# Patient Record
Sex: MALE | Race: OTHER | NOT HISPANIC OR LATINO
[De-identification: names, ages, dates, MRNs, and addresses within clinical notes are randomized per-mention and may not be internally consistent; named-entity substitution may affect disease eponyms.]

---

## 2017-08-15 ENCOUNTER — APPOINTMENT (OUTPATIENT)
Dept: ORTHOPEDIC SURGERY | Facility: CLINIC | Age: 72
End: 2017-08-15
Payer: COMMERCIAL

## 2017-08-15 VITALS
HEART RATE: 60 BPM | SYSTOLIC BLOOD PRESSURE: 141 MMHG | WEIGHT: 186 LBS | DIASTOLIC BLOOD PRESSURE: 80 MMHG | HEIGHT: 70 IN | BODY MASS INDEX: 26.63 KG/M2

## 2017-08-15 DIAGNOSIS — Z80.9 FAMILY HISTORY OF MALIGNANT NEOPLASM, UNSPECIFIED: ICD-10-CM

## 2017-08-15 DIAGNOSIS — Z85.46 PERSONAL HISTORY OF MALIGNANT NEOPLASM OF PROSTATE: ICD-10-CM

## 2017-08-15 DIAGNOSIS — M17.0 BILATERAL PRIMARY OSTEOARTHRITIS OF KNEE: ICD-10-CM

## 2017-08-15 PROCEDURE — 73501 X-RAY EXAM HIP UNI 1 VIEW: CPT

## 2017-08-15 PROCEDURE — 73562 X-RAY EXAM OF KNEE 3: CPT | Mod: 50

## 2017-08-15 PROCEDURE — 99204 OFFICE O/P NEW MOD 45 MIN: CPT

## 2017-08-15 RX ORDER — SIMVASTATIN 40 MG/1
40 TABLET, FILM COATED ORAL
Qty: 90 | Refills: 0 | Status: ACTIVE | COMMUNITY
Start: 2017-05-25

## 2017-08-15 RX ORDER — OLMESARTAN MEDOXOMIL AND HYDROCHLOROTHIAZIDE 20; 12.5 MG/1; MG/1
20-12.5 TABLET ORAL
Qty: 90 | Refills: 0 | Status: ACTIVE | COMMUNITY
Start: 2017-07-22

## 2017-08-15 RX ORDER — ASPIRIN 325 MG/1
325 TABLET, FILM COATED ORAL
Refills: 0 | Status: ACTIVE | COMMUNITY

## 2017-08-15 RX ORDER — POLYETHYLENE GLYCOL 3350, SODIUM CHLORIDE, SODIUM BICARBONATE AND POTASSIUM CHLORIDE WITH LEMON FLAVOR 420; 11.2; 5.72; 1.48 G/4L; G/4L; G/4L; G/4L
420 POWDER, FOR SOLUTION ORAL
Qty: 4000 | Refills: 0 | Status: DISCONTINUED | COMMUNITY
Start: 2017-06-05

## 2017-08-15 RX ORDER — CHOLECALCIFEROL (VITAMIN D3) 25 MCG
TABLET ORAL
Refills: 0 | Status: ACTIVE | COMMUNITY

## 2017-09-13 ENCOUNTER — MOBILE ON CALL (OUTPATIENT)
Age: 72
End: 2017-09-13

## 2017-10-13 ENCOUNTER — OUTPATIENT (OUTPATIENT)
Dept: OUTPATIENT SERVICES | Facility: HOSPITAL | Age: 72
LOS: 1 days | End: 2017-10-13
Payer: COMMERCIAL

## 2017-10-13 VITALS
SYSTOLIC BLOOD PRESSURE: 144 MMHG | TEMPERATURE: 98 F | DIASTOLIC BLOOD PRESSURE: 88 MMHG | OXYGEN SATURATION: 97 % | HEIGHT: 70.3 IN | RESPIRATION RATE: 17 BRPM | WEIGHT: 187.39 LBS | HEART RATE: 62 BPM

## 2017-10-13 DIAGNOSIS — Z90.79 ACQUIRED ABSENCE OF OTHER GENITAL ORGAN(S): Chronic | ICD-10-CM

## 2017-10-13 DIAGNOSIS — Z98.89 OTHER SPECIFIED POSTPROCEDURAL STATES: Chronic | ICD-10-CM

## 2017-10-13 DIAGNOSIS — N36.42 INTRINSIC SPHINCTER DEFICIENCY (ISD): Chronic | ICD-10-CM

## 2017-10-13 DIAGNOSIS — Z01.818 ENCOUNTER FOR OTHER PREPROCEDURAL EXAMINATION: ICD-10-CM

## 2017-10-13 DIAGNOSIS — Z98.890 OTHER SPECIFIED POSTPROCEDURAL STATES: Chronic | ICD-10-CM

## 2017-10-13 DIAGNOSIS — M25.562 PAIN IN LEFT KNEE: ICD-10-CM

## 2017-10-13 DIAGNOSIS — M17.12 UNILATERAL PRIMARY OSTEOARTHRITIS, LEFT KNEE: ICD-10-CM

## 2017-10-13 DIAGNOSIS — N36.42 INTRINSIC SPHINCTER DEFICIENCY (ISD): ICD-10-CM

## 2017-10-13 LAB
ALBUMIN SERPL ELPH-MCNC: 4.4 G/DL — SIGNIFICANT CHANGE UP (ref 3.3–5)
ALP SERPL-CCNC: 56 U/L — SIGNIFICANT CHANGE UP (ref 30–120)
ALT FLD-CCNC: 31 U/L DA — SIGNIFICANT CHANGE UP (ref 10–60)
ANION GAP SERPL CALC-SCNC: 6 MMOL/L — SIGNIFICANT CHANGE UP (ref 5–17)
APTT BLD: 61.7 SEC — HIGH (ref 27.5–37.4)
AST SERPL-CCNC: 20 U/L — SIGNIFICANT CHANGE UP (ref 10–40)
BILIRUB SERPL-MCNC: 1.2 MG/DL — SIGNIFICANT CHANGE UP (ref 0.2–1.2)
BLD GP AB SCN SERPL QL: SIGNIFICANT CHANGE UP
BUN SERPL-MCNC: 20 MG/DL — SIGNIFICANT CHANGE UP (ref 7–23)
CALCIUM SERPL-MCNC: 9.2 MG/DL — SIGNIFICANT CHANGE UP (ref 8.4–10.5)
CHLORIDE SERPL-SCNC: 106 MMOL/L — SIGNIFICANT CHANGE UP (ref 96–108)
CO2 SERPL-SCNC: 30 MMOL/L — SIGNIFICANT CHANGE UP (ref 22–31)
CREAT SERPL-MCNC: 1.14 MG/DL — SIGNIFICANT CHANGE UP (ref 0.5–1.3)
GLUCOSE SERPL-MCNC: 115 MG/DL — HIGH (ref 70–99)
HCT VFR BLD CALC: 47.1 % — SIGNIFICANT CHANGE UP (ref 39–50)
HGB BLD-MCNC: 15.7 G/DL — SIGNIFICANT CHANGE UP (ref 13–17)
INR BLD: 1.08 RATIO — SIGNIFICANT CHANGE UP (ref 0.88–1.16)
MCHC RBC-ENTMCNC: 30.2 PG — SIGNIFICANT CHANGE UP (ref 27–34)
MCHC RBC-ENTMCNC: 33.3 GM/DL — SIGNIFICANT CHANGE UP (ref 32–36)
MCV RBC AUTO: 90.6 FL — SIGNIFICANT CHANGE UP (ref 80–100)
PLATELET # BLD AUTO: 125 K/UL — LOW (ref 150–400)
POTASSIUM SERPL-MCNC: 4 MMOL/L — SIGNIFICANT CHANGE UP (ref 3.5–5.3)
POTASSIUM SERPL-SCNC: 4 MMOL/L — SIGNIFICANT CHANGE UP (ref 3.5–5.3)
PROT SERPL-MCNC: 7.5 G/DL — SIGNIFICANT CHANGE UP (ref 6–8.3)
PROTHROM AB SERPL-ACNC: 11.8 SEC — SIGNIFICANT CHANGE UP (ref 9.8–12.7)
RBC # BLD: 5.2 M/UL — SIGNIFICANT CHANGE UP (ref 4.2–5.8)
RBC # FLD: 12 % — SIGNIFICANT CHANGE UP (ref 10.3–14.5)
SODIUM SERPL-SCNC: 142 MMOL/L — SIGNIFICANT CHANGE UP (ref 135–145)
WBC # BLD: 7.1 K/UL — SIGNIFICANT CHANGE UP (ref 3.8–10.5)
WBC # FLD AUTO: 7.1 K/UL — SIGNIFICANT CHANGE UP (ref 3.8–10.5)

## 2017-10-13 PROCEDURE — 87641 MR-STAPH DNA AMP PROBE: CPT

## 2017-10-13 PROCEDURE — G0463: CPT

## 2017-10-13 PROCEDURE — 80053 COMPREHEN METABOLIC PANEL: CPT

## 2017-10-13 PROCEDURE — 86850 RBC ANTIBODY SCREEN: CPT

## 2017-10-13 PROCEDURE — 86901 BLOOD TYPING SEROLOGIC RH(D): CPT

## 2017-10-13 PROCEDURE — 36415 COLL VENOUS BLD VENIPUNCTURE: CPT

## 2017-10-13 PROCEDURE — 93005 ELECTROCARDIOGRAM TRACING: CPT

## 2017-10-13 PROCEDURE — 85730 THROMBOPLASTIN TIME PARTIAL: CPT

## 2017-10-13 PROCEDURE — 85027 COMPLETE CBC AUTOMATED: CPT

## 2017-10-13 PROCEDURE — 87640 STAPH A DNA AMP PROBE: CPT

## 2017-10-13 PROCEDURE — 93010 ELECTROCARDIOGRAM REPORT: CPT | Mod: NC

## 2017-10-13 PROCEDURE — 85610 PROTHROMBIN TIME: CPT

## 2017-10-13 PROCEDURE — 86900 BLOOD TYPING SEROLOGIC ABO: CPT

## 2017-10-13 NOTE — H&P PST ADULT - PSH
H/O colonoscopy with polypectomy  2017- benign  H/O radical prostatectomy  2000  S/P arthroscopy of left knee  2005- miniscus repair  S/P tonsillectomy  1954  Urethral sphincter deficiency  s/p urethral sphincter implant 2009; replacement 2014 H/O colonoscopy with polypectomy  2017- benign  H/O radical prostatectomy  2000  S/P arthroscopy of left knee  2005- meniscus repair  S/P tonsillectomy  1954  Urethral sphincter deficiency  s/p urethral sphincter implant 2009; replacement 2014

## 2017-10-13 NOTE — H&P PST ADULT - PROBLEM SELECTOR PLAN 2
Patient seeing urologist 10/31/17 to disable urethral sphincter. Patient will get recommendations for post-op urology management.

## 2017-10-13 NOTE — H&P PST ADULT - FAMILY HISTORY
Mother  Still living? No  Family history of diabetes mellitus (DM), Age at diagnosis: Age Unknown  Family history of CHF (congestive heart failure), Age at diagnosis: Age Unknown     Father  Still living? Unknown  Family history of lung cancer, Age at diagnosis: Age Unknown  Family history of coronary artery disease, Age at diagnosis: Age Unknown

## 2017-10-13 NOTE — H&P PST ADULT - PMH
HTN (hypertension)  X 15 years, controlled  Hyperlipidemia    LVH (left ventricular hypertrophy) due to hypertensive disease    Nocturia more than twice per night  3 times a night  Prostate cancer  1999, s/p radical prostatectomy  Urinary incontinence  s/p urethral sphincter implant 2009; replacement 2014  Vitamin d deficiency HTN (hypertension)  X 15 years, controlled  Hyperlipidemia    Knee pain, left    LVH (left ventricular hypertrophy) due to hypertensive disease    Nocturia more than twice per night  3 times a night  Osteoarthritis    Prostate cancer  1999, s/p radical prostatectomy  Urethral sphincter deficiency  s/p urethral sphincter implant 2009; replacement 2014  Urinary incontinence  s/p urethral sphincter implant 2009; replacement 2014  Vitamin d deficiency

## 2017-10-13 NOTE — H&P PST ADULT - VENOUS THROMBOEMBOLISM CURRENT STATUS
major surgery, including arthroscopic and laparoscopic (greater than 1 hr) elective major lower extremity arthroplasty

## 2017-10-13 NOTE — H&P PST ADULT - RS GEN PE MLT RESP DETAILS PC
normal/no rales/no wheezes/no rhonchi/respirations non-labored/breath sounds equal/clear to auscultation bilaterally/good air movement/airway patent

## 2017-10-13 NOTE — H&P PST ADULT - PROBLEM SELECTOR PLAN 1
Left total knee replacement. Patient educated and demonstrated understanding on pepcid and surgical wash, medical and cardiac clearance. Patient educated and demonstrated understanding to take pepcid, benicar, labetalol, norvasc, and quinapril AM of surgery. Patient will see cardiologist to be instructed about stopping aspirin.

## 2017-10-13 NOTE — H&P PST ADULT - HISTORY OF PRESENT ILLNESS
71yo male presents due to left pain. Patient had a arthroscopy to repair a torn meniscus in 2006 without pain relief. Patient stated Physical therapy increased pain. Patient reports current pain 0/10 at rest; 7-8/10 with activity especially walking down stairs. Patient denies use of any pain medication at this time. Patient denies tingling or numbing in lower extremities.

## 2017-10-13 NOTE — H&P PST ADULT - MUSCULOSKELETAL
detailed exam no calf tenderness/decreased ROM due to pain/no joint swelling/no joint erythema/diminished strength/decreased ROM/no joint warmth details…

## 2017-10-14 LAB
MRSA PCR RESULT.: SIGNIFICANT CHANGE UP
S AUREUS DNA NOSE QL NAA+PROBE: DETECTED

## 2017-10-16 RX ORDER — MUPIROCIN 20 MG/G
1 OINTMENT TOPICAL
Qty: 16 | Refills: 0 | OUTPATIENT
Start: 2017-10-16 | End: 2017-10-21

## 2017-10-16 NOTE — PROGRESS NOTE ADULT - SUBJECTIVE AND OBJECTIVE BOX
Nose cx results  + MSSA- Methicillin Sensitive Staph Aureus    Ordered Mupirocin 2% intranasally BID x 5 Days preop left message for pt to call back. Nose cx results  + MSSA- Methicillin Sensitive Staph Aureus    Ordered Mupirocin 2% intranasally BID x 5 Days preop left message for pt to call back.  10-17-17  12:50 pmcalled and spoke w pt will  med today and also  reviewed use and documentation on sheet

## 2017-10-26 RX ORDER — LABETALOL HCL 100 MG
2 TABLET ORAL
Qty: 0 | Refills: 0 | COMMUNITY

## 2017-10-26 RX ORDER — SODIUM CHLORIDE 9 MG/ML
1000 INJECTION, SOLUTION INTRAVENOUS
Qty: 0 | Refills: 0 | Status: DISCONTINUED | OUTPATIENT
Start: 2017-11-01 | End: 2017-11-01

## 2017-10-26 RX ORDER — APREPITANT 80 MG/1
40 CAPSULE ORAL ONCE
Qty: 0 | Refills: 0 | Status: COMPLETED | OUTPATIENT
Start: 2017-11-01 | End: 2017-11-01

## 2017-10-30 NOTE — PROVIDER CONTACT NOTE (OTHER) - ACTION/TREATMENT ORDERED:
spoke to pt's wife who states pt is using Mupirocin. Verbalized understanding to use 2x/day for a total of 5 days. Reminded to bring checklist to hospital on day of surgery

## 2017-10-31 ENCOUNTER — APPOINTMENT (OUTPATIENT)
Dept: UROLOGY | Facility: CLINIC | Age: 72
End: 2017-10-31
Payer: COMMERCIAL

## 2017-10-31 PROCEDURE — 99203 OFFICE O/P NEW LOW 30 MIN: CPT

## 2017-10-31 NOTE — PROGRESS NOTE ADULT - SUBJECTIVE AND OBJECTIVE BOX
Presurgical evaluation:  NKDA  Intolerance: morphine: Nausea    Home Medications:   · 	quinapril 20 mg 2 times a day  · 	Amlodipine (Norvasc®) 5 mg 2 times a day  · 	Olmesartan-HCTZ (Benicar HCT®) 12.5 mg-20 mg once a day  · 	Vitamin D3 2000 intl units once a day  · 	Labetalol 200 mg --2 tabs (400mg) 2 times a day  · 	simvastatin 40 mg once a day (at bedtime)  · 	aspirin 325 mg once a day (at bedtime)    Past Medical History:  HTN (hypertension)  X 15 years, controlled  Hyperlipidemia    Knee pain, left    LVH (left ventricular hypertrophy) due to hypertensive disease    Nocturia more than twice per night  3 times a night  Osteoarthritis    Prostate cancer  1999, s/p radical prostatectomy  Urethral sphincter deficiency  s/p urethral sphincter implant 2009; replacement 2014  Urinary incontinence  s/p urethral sphincter implant 2009; replacement 2014  Vitamin d deficiency     Past Surgical History:  H/O radical prostatectomy  2000  Urethral sphincter deficiency  s/p urethral sphincter implant 2009; replacement 2014    PST values of interest:  Plt 125 (¯)  aPTT 61.7 (­) – cleared by hematology  SCr 1.14 mg/dL  LFTs WNL    Recommendations:  1.	IV TXA  2.	Acetaminophen and celecoxib for multimodal pain management  3.	VTE prophylaxis: ASA EC 162mg q12h x 6 weeks then restart ASA 325mg Qday. Caprini score 11 – if anything increases patient’s risk for VTE (eg, not ambulating, blood transfusion), change to Eliquis® 2.5mg q12h x 12 days followed by ASA EC 162mg q12h x 4 weeks. Presurgical evaluation:  NKDA  Intolerance: morphine: Nausea    Home Medications:   · 	quinapril 20 mg 2 times a day  · 	Amlodipine (Norvasc®) 5 mg 2 times a day  · 	Olmesartan-HCTZ (Benicar HCT®) 12.5 mg-20 mg once a day  · 	Vitamin D3 2000 intl units once a day  · 	Labetalol 200 mg --2 tabs (400mg) 2 times a day  · 	simvastatin 40 mg once a day (at bedtime)  · 	aspirin 325 mg once a day (at bedtime)    Past Medical History:  HTN (hypertension)  X 15 years, controlled  Hyperlipidemia    Knee pain, left    LVH (left ventricular hypertrophy) due to hypertensive disease    Nocturia more than twice per night  3 times a night  Osteoarthritis    Prostate cancer  1999, s/p radical prostatectomy  Urethral sphincter deficiency  s/p urethral sphincter implant 2009; replacement 2014  Urinary incontinence  s/p urethral sphincter implant 2009; replacement 2014  Vitamin d deficiency     Past Surgical History:  H/O radical prostatectomy  2000  Urethral sphincter deficiency  s/p urethral sphincter implant 2009; replacement 2014    PST values of interest:  Plt 125 (¯)  aPTT 61.7 (­) – cleared by hematology  SCr 1.14 mg/dL  LFTs WNL    Recommendations:  1.	IV TXA  2.	Acetaminophen and celecoxib for multimodal pain management  3.	VTE prophylaxis: ASA EC 162mg q12h x 6 weeks then restart ASA 325mg Qday. Caprini score 11 – if anything increases patient’s risk for VTE (eg, not ambulating, blood transfusion), change to Eliquis® 2.5mg q12h x 12 days followed by ASA EC 162mg q12h x 4 weeks.  4.	Patient is a current cigar smoker – offer patient nicotine replacement therapy (eg, patch, inhaler) for breakthrough cravings

## 2017-11-01 ENCOUNTER — INPATIENT (INPATIENT)
Facility: HOSPITAL | Age: 72
LOS: 1 days | Discharge: HOME | DRG: 470 | End: 2017-11-03
Attending: ORTHOPAEDIC SURGERY | Admitting: ORTHOPAEDIC SURGERY
Payer: COMMERCIAL

## 2017-11-01 ENCOUNTER — APPOINTMENT (OUTPATIENT)
Dept: ORTHOPEDIC SURGERY | Facility: HOSPITAL | Age: 72
End: 2017-11-01

## 2017-11-01 ENCOUNTER — RESULT REVIEW (OUTPATIENT)
Age: 72
End: 2017-11-01

## 2017-11-01 VITALS
SYSTOLIC BLOOD PRESSURE: 133 MMHG | HEIGHT: 71 IN | TEMPERATURE: 98 F | HEART RATE: 68 BPM | WEIGHT: 180.78 LBS | RESPIRATION RATE: 13 BRPM | DIASTOLIC BLOOD PRESSURE: 75 MMHG | OXYGEN SATURATION: 98 %

## 2017-11-01 DIAGNOSIS — E78.5 HYPERLIPIDEMIA, UNSPECIFIED: ICD-10-CM

## 2017-11-01 DIAGNOSIS — Z98.890 OTHER SPECIFIED POSTPROCEDURAL STATES: Chronic | ICD-10-CM

## 2017-11-01 DIAGNOSIS — M17.12 UNILATERAL PRIMARY OSTEOARTHRITIS, LEFT KNEE: ICD-10-CM

## 2017-11-01 DIAGNOSIS — Z90.79 ACQUIRED ABSENCE OF OTHER GENITAL ORGAN(S): Chronic | ICD-10-CM

## 2017-11-01 DIAGNOSIS — Z98.89 OTHER SPECIFIED POSTPROCEDURAL STATES: Chronic | ICD-10-CM

## 2017-11-01 DIAGNOSIS — I10 ESSENTIAL (PRIMARY) HYPERTENSION: ICD-10-CM

## 2017-11-01 DIAGNOSIS — N36.42 INTRINSIC SPHINCTER DEFICIENCY (ISD): Chronic | ICD-10-CM

## 2017-11-01 DIAGNOSIS — M25.562 PAIN IN LEFT KNEE: ICD-10-CM

## 2017-11-01 LAB
ANION GAP SERPL CALC-SCNC: 10 MMOL/L — SIGNIFICANT CHANGE UP (ref 5–17)
BUN SERPL-MCNC: 24 MG/DL — HIGH (ref 7–23)
CALCIUM SERPL-MCNC: 9.1 MG/DL — SIGNIFICANT CHANGE UP (ref 8.4–10.5)
CHLORIDE SERPL-SCNC: 103 MMOL/L — SIGNIFICANT CHANGE UP (ref 96–108)
CO2 SERPL-SCNC: 28 MMOL/L — SIGNIFICANT CHANGE UP (ref 22–31)
CREAT SERPL-MCNC: 1.41 MG/DL — HIGH (ref 0.5–1.3)
GLUCOSE SERPL-MCNC: 201 MG/DL — HIGH (ref 70–99)
HCT VFR BLD CALC: 44.2 % — SIGNIFICANT CHANGE UP (ref 39–50)
HGB BLD-MCNC: 14.7 G/DL — SIGNIFICANT CHANGE UP (ref 13–17)
MCHC RBC-ENTMCNC: 29.9 PG — SIGNIFICANT CHANGE UP (ref 27–34)
MCHC RBC-ENTMCNC: 33.3 GM/DL — SIGNIFICANT CHANGE UP (ref 32–36)
MCV RBC AUTO: 90 FL — SIGNIFICANT CHANGE UP (ref 80–100)
PLATELET # BLD AUTO: 145 K/UL — LOW (ref 150–400)
POTASSIUM SERPL-MCNC: 3.4 MMOL/L — LOW (ref 3.5–5.3)
POTASSIUM SERPL-SCNC: 3.4 MMOL/L — LOW (ref 3.5–5.3)
RBC # BLD: 4.92 M/UL — SIGNIFICANT CHANGE UP (ref 4.2–5.8)
RBC # FLD: 11.8 % — SIGNIFICANT CHANGE UP (ref 10.3–14.5)
SODIUM SERPL-SCNC: 141 MMOL/L — SIGNIFICANT CHANGE UP (ref 135–145)
WBC # BLD: 13.5 K/UL — HIGH (ref 3.8–10.5)
WBC # FLD AUTO: 13.5 K/UL — HIGH (ref 3.8–10.5)

## 2017-11-01 PROCEDURE — 88311 DECALCIFY TISSUE: CPT | Mod: 26

## 2017-11-01 PROCEDURE — 27447 TOTAL KNEE ARTHROPLASTY: CPT | Mod: 82,LT

## 2017-11-01 PROCEDURE — 27447 TOTAL KNEE ARTHROPLASTY: CPT | Mod: LT

## 2017-11-01 PROCEDURE — 99223 1ST HOSP IP/OBS HIGH 75: CPT

## 2017-11-01 PROCEDURE — 73562 X-RAY EXAM OF KNEE 3: CPT | Mod: 26,LT

## 2017-11-01 PROCEDURE — 88305 TISSUE EXAM BY PATHOLOGIST: CPT | Mod: 26

## 2017-11-01 RX ORDER — HYDROMORPHONE HYDROCHLORIDE 2 MG/ML
0.5 INJECTION INTRAMUSCULAR; INTRAVENOUS; SUBCUTANEOUS
Qty: 0 | Refills: 0 | Status: DISCONTINUED | OUTPATIENT
Start: 2017-11-01 | End: 2017-11-03

## 2017-11-01 RX ORDER — PANTOPRAZOLE SODIUM 20 MG/1
40 TABLET, DELAYED RELEASE ORAL DAILY
Qty: 0 | Refills: 0 | Status: DISCONTINUED | OUTPATIENT
Start: 2017-11-01 | End: 2017-11-03

## 2017-11-01 RX ORDER — SIMVASTATIN 20 MG/1
1 TABLET, FILM COATED ORAL
Qty: 0 | Refills: 0 | COMMUNITY

## 2017-11-01 RX ORDER — LABETALOL HCL 100 MG
200 TABLET ORAL
Qty: 0 | Refills: 0 | Status: DISCONTINUED | OUTPATIENT
Start: 2017-11-01 | End: 2017-11-03

## 2017-11-01 RX ORDER — LABETALOL HCL 100 MG
400 TABLET ORAL
Qty: 0 | Refills: 0 | COMMUNITY

## 2017-11-01 RX ORDER — ACETAMINOPHEN 500 MG
1000 TABLET ORAL ONCE
Qty: 0 | Refills: 0 | Status: COMPLETED | OUTPATIENT
Start: 2017-11-01 | End: 2017-11-01

## 2017-11-01 RX ORDER — SIMVASTATIN 20 MG/1
40 TABLET, FILM COATED ORAL AT BEDTIME
Qty: 0 | Refills: 0 | Status: DISCONTINUED | OUTPATIENT
Start: 2017-11-01 | End: 2017-11-03

## 2017-11-01 RX ORDER — DOCUSATE SODIUM 100 MG
100 CAPSULE ORAL THREE TIMES A DAY
Qty: 0 | Refills: 0 | Status: DISCONTINUED | OUTPATIENT
Start: 2017-11-01 | End: 2017-11-03

## 2017-11-01 RX ORDER — CHOLECALCIFEROL (VITAMIN D3) 125 MCG
2000 CAPSULE ORAL DAILY
Qty: 0 | Refills: 0 | Status: DISCONTINUED | OUTPATIENT
Start: 2017-11-01 | End: 2017-11-01

## 2017-11-01 RX ORDER — OLMESARTAN MEDOXOMIL-HYDROCHLOROTHIAZIDE 25; 40 MG/1; MG/1
1 TABLET, FILM COATED ORAL
Qty: 0 | Refills: 0 | COMMUNITY

## 2017-11-01 RX ORDER — TRANEXAMIC ACID 100 MG/ML
1000 INJECTION, SOLUTION INTRAVENOUS ONCE
Qty: 0 | Refills: 0 | Status: DISCONTINUED | OUTPATIENT
Start: 2017-11-01 | End: 2017-11-01

## 2017-11-01 RX ORDER — HYDROMORPHONE HYDROCHLORIDE 2 MG/ML
0.5 INJECTION INTRAMUSCULAR; INTRAVENOUS; SUBCUTANEOUS
Qty: 0 | Refills: 0 | Status: DISCONTINUED | OUTPATIENT
Start: 2017-11-01 | End: 2017-11-01

## 2017-11-01 RX ORDER — CEFAZOLIN SODIUM 1 G
2000 VIAL (EA) INJECTION EVERY 8 HOURS
Qty: 0 | Refills: 0 | Status: COMPLETED | OUTPATIENT
Start: 2017-11-01 | End: 2017-11-02

## 2017-11-01 RX ORDER — AMLODIPINE BESYLATE 2.5 MG/1
5 TABLET ORAL
Qty: 0 | Refills: 0 | Status: DISCONTINUED | OUTPATIENT
Start: 2017-11-01 | End: 2017-11-03

## 2017-11-01 RX ORDER — ASPIRIN/CALCIUM CARB/MAGNESIUM 324 MG
162 TABLET ORAL EVERY 12 HOURS
Qty: 0 | Refills: 0 | Status: CANCELLED | OUTPATIENT
Start: 2017-11-02 | End: 2017-11-03

## 2017-11-01 RX ORDER — LOSARTAN POTASSIUM 100 MG/1
100 TABLET, FILM COATED ORAL DAILY
Qty: 0 | Refills: 0 | Status: DISCONTINUED | OUTPATIENT
Start: 2017-11-02 | End: 2017-11-03

## 2017-11-01 RX ORDER — OXYCODONE HYDROCHLORIDE 5 MG/1
10 TABLET ORAL
Qty: 0 | Refills: 0 | Status: DISCONTINUED | OUTPATIENT
Start: 2017-11-01 | End: 2017-11-03

## 2017-11-01 RX ORDER — SODIUM CHLORIDE 9 MG/ML
1000 INJECTION, SOLUTION INTRAVENOUS
Qty: 0 | Refills: 0 | Status: DISCONTINUED | OUTPATIENT
Start: 2017-11-01 | End: 2017-11-01

## 2017-11-01 RX ORDER — ONDANSETRON 8 MG/1
4 TABLET, FILM COATED ORAL ONCE
Qty: 0 | Refills: 0 | Status: DISCONTINUED | OUTPATIENT
Start: 2017-11-01 | End: 2017-11-01

## 2017-11-01 RX ORDER — CEFAZOLIN SODIUM 1 G
2000 VIAL (EA) INJECTION ONCE
Qty: 0 | Refills: 0 | Status: COMPLETED | OUTPATIENT
Start: 2017-11-01 | End: 2017-11-01

## 2017-11-01 RX ORDER — ACETAMINOPHEN 500 MG
1000 TABLET ORAL EVERY 6 HOURS
Qty: 0 | Refills: 0 | Status: COMPLETED | OUTPATIENT
Start: 2017-11-01 | End: 2017-11-02

## 2017-11-01 RX ORDER — ONDANSETRON 8 MG/1
4 TABLET, FILM COATED ORAL EVERY 6 HOURS
Qty: 0 | Refills: 0 | Status: DISCONTINUED | OUTPATIENT
Start: 2017-11-01 | End: 2017-11-03

## 2017-11-01 RX ORDER — MAGNESIUM HYDROXIDE 400 MG/1
30 TABLET, CHEWABLE ORAL DAILY
Qty: 0 | Refills: 0 | Status: DISCONTINUED | OUTPATIENT
Start: 2017-11-01 | End: 2017-11-03

## 2017-11-01 RX ORDER — POLYETHYLENE GLYCOL 3350 17 G/17G
17 POWDER, FOR SOLUTION ORAL DAILY
Qty: 0 | Refills: 0 | Status: DISCONTINUED | OUTPATIENT
Start: 2017-11-01 | End: 2017-11-03

## 2017-11-01 RX ORDER — SODIUM CHLORIDE 9 MG/ML
1000 INJECTION, SOLUTION INTRAVENOUS
Qty: 0 | Refills: 0 | Status: DISCONTINUED | OUTPATIENT
Start: 2017-11-01 | End: 2017-11-02

## 2017-11-01 RX ORDER — ASPIRIN/CALCIUM CARB/MAGNESIUM 324 MG
1 TABLET ORAL
Qty: 0 | Refills: 0 | COMMUNITY

## 2017-11-01 RX ORDER — OXYCODONE HYDROCHLORIDE 5 MG/1
5 TABLET ORAL
Qty: 0 | Refills: 0 | Status: DISCONTINUED | OUTPATIENT
Start: 2017-11-01 | End: 2017-11-03

## 2017-11-01 RX ORDER — ACETAMINOPHEN 500 MG
1000 TABLET ORAL EVERY 8 HOURS
Qty: 0 | Refills: 0 | Status: CANCELLED | OUTPATIENT
Start: 2017-11-02 | End: 2017-11-03

## 2017-11-01 RX ORDER — CELECOXIB 200 MG/1
200 CAPSULE ORAL
Qty: 0 | Refills: 0 | Status: CANCELLED | OUTPATIENT
Start: 2017-11-02 | End: 2017-11-03

## 2017-11-01 RX ORDER — CHOLECALCIFEROL (VITAMIN D3) 125 MCG
2000 CAPSULE ORAL DAILY
Qty: 0 | Refills: 0 | Status: DISCONTINUED | OUTPATIENT
Start: 2017-11-01 | End: 2017-11-03

## 2017-11-01 RX ADMIN — Medication 400 MILLIGRAM(S): at 19:28

## 2017-11-01 RX ADMIN — SIMVASTATIN 40 MILLIGRAM(S): 20 TABLET, FILM COATED ORAL at 21:05

## 2017-11-01 RX ADMIN — SODIUM CHLORIDE 75 MILLILITER(S): 9 INJECTION, SOLUTION INTRAVENOUS at 11:16

## 2017-11-01 RX ADMIN — Medication 100 MILLIGRAM(S): at 21:05

## 2017-11-01 RX ADMIN — APREPITANT 40 MILLIGRAM(S): 80 CAPSULE ORAL at 11:16

## 2017-11-01 RX ADMIN — Medication 100 MILLIGRAM(S): at 19:59

## 2017-11-01 RX ADMIN — Medication 1000 MILLIGRAM(S): at 19:29

## 2017-11-01 NOTE — PATIENT PROFILE ADULT. - PMH
HTN (hypertension)  X 15 years, controlled  Hyperlipidemia    Knee pain, left    LVH (left ventricular hypertrophy) due to hypertensive disease    Nocturia more than twice per night  3 times a night  Osteoarthritis    Prostate cancer  1999, s/p radical prostatectomy  Urethral sphincter deficiency  s/p urethral sphincter implant 2009; replacement 2014  Urinary incontinence  s/p urethral sphincter implant 2009; replacement 2014  Vitamin d deficiency

## 2017-11-01 NOTE — CONSULT NOTE ADULT - SUBJECTIVE AND OBJECTIVE BOX
CC.  Left knee pain    HPI.  Patient is 71 yo male with hx of HTN, and HLD presenting with S/P left Total knee replacement.  Pain well controlled.  Offers no other complaints  Prior to surgery, patient reports left knee pain Has not improved S/P arthroscopy to repair a torn meniscus in 2006.  Patient stated Physical therapy increased pain. Patient reports current pain 0/10 at rest; 7-8/10 with activity especially walking down stairs. Patient denies use of any pain medication at this time. Patient denies tingling or numbing in lower extremities.         Constitutional: No fever, fatigue or weight loss.  Skin: No rash.  Eyes: No recent vision problems or eye pain.  ENT: No congestion, ear pain, or sore throat.  Endocrine: No thyroid problems.  Cardiovascular: No chest pain or palpation.  Respiratory: No cough, shortness of breath, congestion, or wheezing.  Gastrointestinal: No abdominal pain, nausea, vomiting, or diarrhea.  Genitourinary: No dysuria.  Musculoskeletal: No joint swelling.  Neurologic: No headache.      Allergies/Medications:   morphine: Drug, Nausea    Home Medications:   * Patient Currently Takes Medications as of 13-Oct-2017 12:37 documented in Structured Notes  · 	quinapril 20 mg oral tablet: Last Dose Taken:  ,  orally 2 times a day  · 	Norvasc 5 mg oral tablet: Last Dose Taken:  ,  orally 2 times a day  · 	Benicar HCT 12.5 mg-20 mg oral tablet: Last Dose Taken:  , 1 tab(s) orally once a day  · 	Vitamin D3 2000 intl units oral capsule: Last Dose Taken:  , 1 cap(s) orally once a day  · 	labetalol 200 mg oral tablet: Last Dose Taken:  , 2 tab(s) orally 2 times a day  · 	simvastatin 40 mg oral tablet: Last Dose Taken:  , 1 tab(s) orally once a day (at bedtime)  · 	aspirin 325 mg oral tablet: Last Dose Taken:  , 1 tab(s) orally once a day (at bedtime)    .    PMH/PSH/FH/SH:    Past Medical History:  HTN (hypertension)  X 15 years, controlled  Hyperlipidemia    Knee pain, left    LVH (left ventricular hypertrophy) due to hypertensive disease    Nocturia more than twice per night  3 times a night  Osteoarthritis    Prostate cancer  1999, s/p radical prostatectomy  Urethral sphincter deficiency  s/p urethral sphincter implant 2009; replacement 2014  Urinary incontinence  s/p urethral sphincter implant 2009; replacement 2014  Vitamin d deficiency.     Past Surgical History:  H/O colonoscopy with polypectomy  2017- benign  H/O radical prostatectomy  2000  S/P arthroscopy of left knee  2005- meniscus repair  S/P tonsillectomy  1954  Urethral sphincter deficiency  s/p urethral sphincter implant 2009; replacement 2014.      Vital Signs Last 24 Hrs  T(C): 36.9 (01 Nov 2017 14:45), Max: 36.9 (01 Nov 2017 14:45)  T(F): 98.4 (01 Nov 2017 14:45), Max: 98.4 (01 Nov 2017 14:45)  HR: 60 (01 Nov 2017 15:30) (57 - 71)  BP: 131/83 (01 Nov 2017 15:30) (103/72 - 133/75)  BP(mean): --  RR: 16 (01 Nov 2017 15:30) (12 - 22)  SpO2: 100% (01 Nov 2017 15:30) (98% - 100%)      PHYSICAL EXAM-  GENERAL: NAD  HEAD:  Atraumatic, Normocephalic  NECK: Supple, No JVD, Normal thyroid  NERVOUS SYSTEM:  Alert & Oriented X3, Motor Strength 5/5 B/L upper and lower extremities; DTRs 2+ intact and symmetric  CHEST/LUNG: Clear to percussion bilaterally; No rales, rhonchi, wheezing, or rubs  HEART: Regular rate and rhythm; No murmurs, rubs, or gallops  ABDOMEN: Soft, Nontender, Nondistended; Bowel sounds present  EXTREMITIES:  2+ Peripheral Pulses, No clubbing, cyanosis, or edema  SKIN: No rashes or lesions CC.  Left knee pain    HPI.  Patient is 71 yo male with hx of HTN, and HLD presenting with S/P left Total knee replacement.  Pain well controlled.  Offers no other complaints  Prior to surgery, patient reports left knee pain Has not improved S/P arthroscopy to repair a torn meniscus in 2006.  Patient stated Physical therapy increased pain. Patient reports current pain 0/10 at rest; 7-8/10 with activity especially walking down stairs. Patient denies use of any pain medication at this time. Patient denies tingling or numbing in lower extremities.         Constitutional: No fever, fatigue or weight loss.  Skin: No rash.  Eyes: No recent vision problems or eye pain.  ENT: No congestion, ear pain, or sore throat.  Endocrine: No thyroid problems.  Cardiovascular: No chest pain or palpation.  Respiratory: No cough, shortness of breath, congestion, or wheezing.  Gastrointestinal: No abdominal pain, nausea, vomiting, or diarrhea.  Genitourinary: No dysuria.  Musculoskeletal: No joint swelling.  Neurologic: No headache.      Allergies/Medications:   morphine: Drug, Nausea    Home Medications:   * Patient Currently Takes Medications as of 13-Oct-2017 12:37 documented in Structured Notes  · 	quinapril 20 mg oral tablet: Last Dose Taken:  ,  orally 2 times a day  · 	Norvasc 5 mg oral tablet: Last Dose Taken:  ,  orally 2 times a day  · 	Benicar HCT 12.5 mg-20 mg oral tablet: Last Dose Taken:  , 1 tab(s) orally once a day  · 	Vitamin D3 2000 intl units oral capsule: Last Dose Taken:  , 1 cap(s) orally once a day  · 	labetalol 200 mg oral tablet: Last Dose Taken:  , 2 tab(s) orally 2 times a day  · 	simvastatin 40 mg oral tablet: Last Dose Taken:  , 1 tab(s) orally once a day (at bedtime)  · 	aspirin 325 mg oral tablet: Last Dose Taken:  , 1 tab(s) orally once a day (at bedtime)    .    PMH/PSH/FH/SH:    Past Medical History:  HTN (hypertension)  X 15 years, controlled  Hyperlipidemia    Knee pain, left    LVH (left ventricular hypertrophy) due to hypertensive disease    Nocturia more than twice per night  3 times a night  Osteoarthritis    Prostate cancer  1999, s/p radical prostatectomy  Urethral sphincter deficiency  s/p urethral sphincter implant 2009; replacement 2014  Urinary incontinence  s/p urethral sphincter implant 2009; replacement 2014  Vitamin d deficiency.     Past Surgical History:  H/O colonoscopy with polypectomy  2017- benign  H/O radical prostatectomy  2000  S/P arthroscopy of left knee  2005- meniscus repair  S/P tonsillectomy  1954  Urethral sphincter deficiency  s/p urethral sphincter implant 2009; replacement 2014.    SHX lives with wife.  denies any ETOH/Tob/Illicit drug usage    FHX non-contributory    Vital Signs Last 24 Hrs  T(C): 36.9 (01 Nov 2017 14:45), Max: 36.9 (01 Nov 2017 14:45)  T(F): 98.4 (01 Nov 2017 14:45), Max: 98.4 (01 Nov 2017 14:45)  HR: 60 (01 Nov 2017 15:30) (57 - 71)  BP: 131/83 (01 Nov 2017 15:30) (103/72 - 133/75)  BP(mean): --  RR: 16 (01 Nov 2017 15:30) (12 - 22)  SpO2: 100% (01 Nov 2017 15:30) (98% - 100%)      PHYSICAL EXAM-  GENERAL: NAD  HEAD:  Atraumatic, Normocephalic  NECK: Supple, No JVD, Normal thyroid  NERVOUS SYSTEM:  Alert & Oriented X3, Motor Strength 5/5 B/L upper and lower extremities; DTRs 2+ intact and symmetric  CHEST/LUNG: Clear to percussion bilaterally; No rales, rhonchi, wheezing, or rubs  HEART: Regular rate and rhythm; No murmurs, rubs, or gallops  ABDOMEN: Soft, Nontender, Nondistended; Bowel sounds present  EXTREMITIES:  2+ Peripheral Pulses, No clubbing, cyanosis, or edema  SKIN: No rashes or lesions CC.  Left knee pain    HPI.  Patient is 71 yo male with hx of HTN, and HLD presenting with S/P left Total knee replacement.  Pain well controlled.  Offers no other complaints  Prior to surgery, patient reports left knee pain Has not improved S/P arthroscopy to repair a torn meniscus in 2006.  Patient stated Physical therapy increased pain. Patient reports current pain 0/10 at rest; 7-8/10 with activity especially walking down stairs. Patient denies use of any pain medication at this time. Patient denies tingling or numbing in lower extremities.     pre-op labs and intra-operative x-ray were reviewed      Constitutional: No fever, fatigue or weight loss.  Skin: No rash.  Eyes: No recent vision problems or eye pain.  ENT: No congestion, ear pain, or sore throat.  Endocrine: No thyroid problems.  Cardiovascular: No chest pain or palpation.  Respiratory: No cough, shortness of breath, congestion, or wheezing.  Gastrointestinal: No abdominal pain, nausea, vomiting, or diarrhea.  Genitourinary: No dysuria.  Musculoskeletal: No joint swelling.  Neurologic: No headache.      Allergies/Medications:   morphine: Drug, Nausea    Home Medications:   * Patient Currently Takes Medications as of 13-Oct-2017 12:37 documented in Structured Notes  · 	quinapril 20 mg oral tablet: Last Dose Taken:  ,  orally 2 times a day  · 	Norvasc 5 mg oral tablet: Last Dose Taken:  ,  orally 2 times a day  · 	Benicar HCT 12.5 mg-20 mg oral tablet: Last Dose Taken:  , 1 tab(s) orally once a day  · 	Vitamin D3 2000 intl units oral capsule: Last Dose Taken:  , 1 cap(s) orally once a day  · 	labetalol 200 mg oral tablet: Last Dose Taken:  , 2 tab(s) orally 2 times a day  · 	simvastatin 40 mg oral tablet: Last Dose Taken:  , 1 tab(s) orally once a day (at bedtime)  · 	aspirin 325 mg oral tablet: Last Dose Taken:  , 1 tab(s) orally once a day (at bedtime)    .    PMH/PSH/FH/SH:    Past Medical History:  HTN (hypertension)  X 15 years, controlled  Hyperlipidemia    Knee pain, left    LVH (left ventricular hypertrophy) due to hypertensive disease    Nocturia more than twice per night  3 times a night  Osteoarthritis    Prostate cancer  1999, s/p radical prostatectomy  Urethral sphincter deficiency  s/p urethral sphincter implant 2009; replacement 2014  Urinary incontinence  s/p urethral sphincter implant 2009; replacement 2014  Vitamin d deficiency.     Past Surgical History:  H/O colonoscopy with polypectomy  2017- benign  H/O radical prostatectomy  2000  S/P arthroscopy of left knee  2005- meniscus repair  S/P tonsillectomy  1954  Urethral sphincter deficiency  s/p urethral sphincter implant 2009; replacement 2014.    SHX lives with wife.  denies any ETOH/Tob/Illicit drug usage    FHX non-contributory    Vital Signs Last 24 Hrs  T(C): 36.9 (01 Nov 2017 14:45), Max: 36.9 (01 Nov 2017 14:45)  T(F): 98.4 (01 Nov 2017 14:45), Max: 98.4 (01 Nov 2017 14:45)  HR: 60 (01 Nov 2017 15:30) (57 - 71)  BP: 131/83 (01 Nov 2017 15:30) (103/72 - 133/75)  BP(mean): --  RR: 16 (01 Nov 2017 15:30) (12 - 22)  SpO2: 100% (01 Nov 2017 15:30) (98% - 100%)      PHYSICAL EXAM-  GENERAL: NAD  HEAD:  Atraumatic, Normocephalic  NECK: Supple, No JVD, Normal thyroid  NERVOUS SYSTEM:  Alert & Oriented X3, Motor Strength 5/5 B/L upper and lower extremities; DTRs 2+ intact and symmetric  CHEST/LUNG: Clear to percussion bilaterally; No rales, rhonchi, wheezing, or rubs  HEART: Regular rate and rhythm; No murmurs, rubs, or gallops  ABDOMEN: Soft, Nontender, Nondistended; Bowel sounds present  EXTREMITIES:  2+ Peripheral Pulses, No clubbing, cyanosis, or edema  SKIN: No rashes or lesions

## 2017-11-01 NOTE — BRIEF OPERATIVE NOTE - PROCEDURE
<<-----Click on this checkbox to enter Procedure Knee replacement  11/01/2017  left  Active  Jose Sheehan

## 2017-11-01 NOTE — PROGRESS NOTE ADULT - SUBJECTIVE AND OBJECTIVE BOX
Orthopaedic Post Op Note    Procedure: Left TKR  Surgeon: Mika Carter MD    72y Male comfortable, without complaints. Reported pain score = 0  Denies N/V, CP, SOB, numbness/tingling of extremities.    PE:  Vital Signs Last 24 Hrs  T(C): 36.9 (01 Nov 2017 14:45), Max: 36.9 (01 Nov 2017 14:45)  T(F): 98.4 (01 Nov 2017 14:45), Max: 98.4 (01 Nov 2017 14:45)  HR: 54 (01 Nov 2017 17:28) (54 - 71)  BP: 124/74 (01 Nov 2017 17:00) (103/72 - 156/89)  RR: 15 (01 Nov 2017 17:00) (12 - 22)  SpO2: 100% (01 Nov 2017 17:28) (98% - 100%)  General: Pt alert and oriented   Lungs: + BS CTA bilaterally  Heart: +S1 & S2 heard, RRR  Abd: + BS heard, soft, NT, ND  Left Knee Dressing: C/D/I   Bilateral LEs:  Motor:   5/5 dorsiflexion, plantarflexion, EHL  Sensation intact to LT   + DP Pulses    A/P: 72y Male POD#0 s/p Left TKR  - Stable  - Acetaminophen, Celebrex, Dilaudid/Oxycodone for Pain Control   - DVT ppx: Aspirin  - Carmel op IV abx: Ancef  - PT, OT per protocol  - F/U AM Labs  DCP= Home Friday

## 2017-11-01 NOTE — CONSULT NOTE ADULT - PROBLEM SELECTOR RECOMMENDATION 3
Continue with Norvasc, quinapril, and labetalol.  Hold Benicar/HCTZ.  Monitor BP Continue with Norvasc, labetalol, and losartan.  Hold benazapril/HCTZ.  Monitor BP

## 2017-11-02 ENCOUNTER — TRANSCRIPTION ENCOUNTER (OUTPATIENT)
Age: 72
End: 2017-11-02

## 2017-11-02 DIAGNOSIS — N28.9 DISORDER OF KIDNEY AND URETER, UNSPECIFIED: ICD-10-CM

## 2017-11-02 DIAGNOSIS — E87.6 HYPOKALEMIA: ICD-10-CM

## 2017-11-02 LAB
ANION GAP SERPL CALC-SCNC: 10 MMOL/L — SIGNIFICANT CHANGE UP (ref 5–17)
BUN SERPL-MCNC: 23 MG/DL — SIGNIFICANT CHANGE UP (ref 7–23)
CALCIUM SERPL-MCNC: 8.9 MG/DL — SIGNIFICANT CHANGE UP (ref 8.4–10.5)
CHLORIDE SERPL-SCNC: 103 MMOL/L — SIGNIFICANT CHANGE UP (ref 96–108)
CO2 SERPL-SCNC: 26 MMOL/L — SIGNIFICANT CHANGE UP (ref 22–31)
CREAT SERPL-MCNC: 1.18 MG/DL — SIGNIFICANT CHANGE UP (ref 0.5–1.3)
GLUCOSE SERPL-MCNC: 134 MG/DL — HIGH (ref 70–99)
HCT VFR BLD CALC: 41.2 % — SIGNIFICANT CHANGE UP (ref 39–50)
HGB BLD-MCNC: 14.6 G/DL — SIGNIFICANT CHANGE UP (ref 13–17)
MCHC RBC-ENTMCNC: 31.3 PG — SIGNIFICANT CHANGE UP (ref 27–34)
MCHC RBC-ENTMCNC: 35.5 GM/DL — SIGNIFICANT CHANGE UP (ref 32–36)
MCV RBC AUTO: 88.2 FL — SIGNIFICANT CHANGE UP (ref 80–100)
PLATELET # BLD AUTO: 148 K/UL — LOW (ref 150–400)
POTASSIUM SERPL-MCNC: 3.3 MMOL/L — LOW (ref 3.5–5.3)
POTASSIUM SERPL-SCNC: 3.3 MMOL/L — LOW (ref 3.5–5.3)
RBC # BLD: 4.67 M/UL — SIGNIFICANT CHANGE UP (ref 4.2–5.8)
RBC # FLD: 11.1 % — SIGNIFICANT CHANGE UP (ref 10.3–14.5)
SODIUM SERPL-SCNC: 139 MMOL/L — SIGNIFICANT CHANGE UP (ref 135–145)
WBC # BLD: 16.7 K/UL — HIGH (ref 3.8–10.5)
WBC # FLD AUTO: 16.7 K/UL — HIGH (ref 3.8–10.5)

## 2017-11-02 PROCEDURE — 99232 SBSQ HOSP IP/OBS MODERATE 35: CPT

## 2017-11-02 RX ORDER — ASPIRIN/CALCIUM CARB/MAGNESIUM 324 MG
2 TABLET ORAL
Qty: 160 | Refills: 0 | OUTPATIENT
Start: 2017-11-02 | End: 2017-12-12

## 2017-11-02 RX ORDER — PANTOPRAZOLE SODIUM 20 MG/1
1 TABLET, DELAYED RELEASE ORAL
Qty: 40 | Refills: 0 | OUTPATIENT
Start: 2017-11-02 | End: 2017-12-12

## 2017-11-02 RX ORDER — CELECOXIB 200 MG/1
1 CAPSULE ORAL
Qty: 38 | Refills: 0 | OUTPATIENT
Start: 2017-11-02 | End: 2017-11-21

## 2017-11-02 RX ORDER — ACETAMINOPHEN 500 MG
2 TABLET ORAL
Qty: 84 | Refills: 0 | OUTPATIENT
Start: 2017-11-02 | End: 2017-11-16

## 2017-11-02 RX ORDER — POTASSIUM CHLORIDE 20 MEQ
20 PACKET (EA) ORAL ONCE
Qty: 0 | Refills: 0 | Status: COMPLETED | OUTPATIENT
Start: 2017-11-02 | End: 2017-11-02

## 2017-11-02 RX ORDER — POTASSIUM CHLORIDE 20 MEQ
40 PACKET (EA) ORAL ONCE
Qty: 0 | Refills: 0 | Status: DISCONTINUED | OUTPATIENT
Start: 2017-11-02 | End: 2017-11-02

## 2017-11-02 RX ORDER — OXYCODONE HYDROCHLORIDE 5 MG/1
1 TABLET ORAL
Qty: 50 | Refills: 0 | OUTPATIENT
Start: 2017-11-02 | End: 2017-11-12

## 2017-11-02 RX ADMIN — Medication 100 MILLIGRAM(S): at 06:10

## 2017-11-02 RX ADMIN — Medication 1000 MILLIGRAM(S): at 21:11

## 2017-11-02 RX ADMIN — Medication 200 MILLIGRAM(S): at 06:10

## 2017-11-02 RX ADMIN — OXYCODONE HYDROCHLORIDE 10 MILLIGRAM(S): 5 TABLET ORAL at 06:10

## 2017-11-02 RX ADMIN — SIMVASTATIN 40 MILLIGRAM(S): 20 TABLET, FILM COATED ORAL at 21:12

## 2017-11-02 RX ADMIN — Medication 20 MILLIEQUIVALENT(S): at 10:39

## 2017-11-02 RX ADMIN — Medication 100 MILLIGRAM(S): at 12:54

## 2017-11-02 RX ADMIN — Medication 400 MILLIGRAM(S): at 07:43

## 2017-11-02 RX ADMIN — Medication 1000 MILLIGRAM(S): at 12:59

## 2017-11-02 RX ADMIN — CELECOXIB 200 MILLIGRAM(S): 200 CAPSULE ORAL at 18:06

## 2017-11-02 RX ADMIN — LOSARTAN POTASSIUM 100 MILLIGRAM(S): 100 TABLET, FILM COATED ORAL at 06:10

## 2017-11-02 RX ADMIN — PANTOPRAZOLE SODIUM 40 MILLIGRAM(S): 20 TABLET, DELAYED RELEASE ORAL at 12:54

## 2017-11-02 RX ADMIN — Medication 400 MILLIGRAM(S): at 02:00

## 2017-11-02 RX ADMIN — OXYCODONE HYDROCHLORIDE 10 MILLIGRAM(S): 5 TABLET ORAL at 05:40

## 2017-11-02 RX ADMIN — Medication 1000 MILLIGRAM(S): at 07:43

## 2017-11-02 RX ADMIN — Medication 162 MILLIGRAM(S): at 08:51

## 2017-11-02 RX ADMIN — Medication 100 MILLIGRAM(S): at 03:33

## 2017-11-02 RX ADMIN — AMLODIPINE BESYLATE 5 MILLIGRAM(S): 2.5 TABLET ORAL at 18:06

## 2017-11-02 RX ADMIN — Medication 100 MILLIGRAM(S): at 21:11

## 2017-11-02 RX ADMIN — Medication 1000 MILLIGRAM(S): at 02:01

## 2017-11-02 RX ADMIN — AMLODIPINE BESYLATE 5 MILLIGRAM(S): 2.5 TABLET ORAL at 06:10

## 2017-11-02 RX ADMIN — Medication 162 MILLIGRAM(S): at 21:12

## 2017-11-02 RX ADMIN — CELECOXIB 200 MILLIGRAM(S): 200 CAPSULE ORAL at 18:07

## 2017-11-02 RX ADMIN — Medication 1000 MILLIGRAM(S): at 12:54

## 2017-11-02 RX ADMIN — CELECOXIB 200 MILLIGRAM(S): 200 CAPSULE ORAL at 08:51

## 2017-11-02 RX ADMIN — Medication 2000 UNIT(S): at 12:54

## 2017-11-02 RX ADMIN — CELECOXIB 200 MILLIGRAM(S): 200 CAPSULE ORAL at 08:50

## 2017-11-02 NOTE — DISCHARGE NOTE ADULT - CARE PROVIDER_API CALL
Mika Carter), Orthopaedic Surgery  [address] [place] [address]  Phone: [phone]  Fax: [phone]

## 2017-11-02 NOTE — PROGRESS NOTE ADULT - PROBLEM SELECTOR PLAN 1
S/P Left Total knee replacement.  Continue with pain management, DVT proph, and wound care as per Ortho.  PT/OT

## 2017-11-02 NOTE — DISCHARGE NOTE ADULT - MEDICATION SUMMARY - MEDICATIONS TO STOP TAKING
I will STOP taking the medications listed below when I get home from the hospital:    mupirocin 2% topical ointment  -- 1 application intranasally 2 times a day   -- For external use only.    olmesartan-hydrochlorothiazide 40 mg-12.5 mg oral tablet  -- 1 tab(s) by mouth once a day

## 2017-11-02 NOTE — PROGRESS NOTE ADULT - SUBJECTIVE AND OBJECTIVE BOX
MIAH FIGUEROA                                                                [id number]                                                      Allergies---morphine (Nausea)  No Known Allergies        Pt is a 72y year old Male s/p left TKR.    Pain is 2/10.   Tolerating the diet.   The patient is A&O x 3, resting comfortably in bed with no complaints.   Denies any chest pain / shortness of breath / dyspnea/ nausea / vomiting / headaches or light headed ness.      Vital Signs Last 24 Hrs  T(F): 97.8 (11-02-17 @ 07:45), Max: 98.8 (11-01-17 @ 23:30)  HR: 61 (11-02-17 @ 07:45)  BP: 149/77 (11-02-17 @ 07:45)  RR: 16 (11-02-17 @ 07:45)  SpO2: 97% (11-02-17 @ 07:45)      I&O's Detail    01 Nov 2017 07:01  -  02 Nov 2017 07:00  --------------------------------------------------------  IN:    lactated ringers.: 700 mL  Total IN: 700 mL    OUT:    Estimated Blood Loss: 200 mL    Voided: 700 mL  Total OUT: 900 mL    Total NET: -200 mL      PE:       Left Lower Extremity:   Dressing/Ace wrap is C/D/I.   Neurovascularly intact.   No gross evidence of motor or sensory deficit.   +2  DP/PT pulses.   EHL/FHL/TA intact.   Toes are pink and mobile.   Capillary refill < 2 seconds.   Negative calf tenderness.   PAS on.   HV in place                               14.6   16.7  )--------------( 148                          11-02-17 @ 06:52               41.2       139   |  103  |  23  -----------------------------<  134                  11-02-17 @ 06:52  3.3    |  26    |  1.18    Ca    8.9                A:   Pt is a 72y year old Male S/P left total knee replacement, Post Op Day #1        Plan:    - Follow up with Medicine   - OOB with PT/OT   - DVT ppx = PAS +     - Pain control    - Incentive spirometry   - Labs in A.M.   - Dressing change tomorrow   - D/C planning                                                                                                                                                                           David Couch RPA-C

## 2017-11-02 NOTE — DISCHARGE NOTE ADULT - NS AS ACTIVITY OBS
Showering allowed after post-operative day 5/Showering allowed/Do not make important decisions/Do not drive or operate machinery/No Heavy lifting/straining

## 2017-11-02 NOTE — DISCHARGE NOTE ADULT - INSTRUCTIONS
For Constipation :   • Increase your water intake. Drink at least 8 glasses of water daily.  • Try adding fiber to your diet by eating fruits, vegetables and foods that are rich in grains.  • If you do experience constipation, you may take an over-the-counter stool softener/laxative such as Carmel Colace, [place] or  Milk of Magnesia. left knee

## 2017-11-02 NOTE — OCCUPATIONAL THERAPY INITIAL EVALUATION ADULT - ADDITIONAL COMMENTS
Pt lives in a house with 3 steps with no handrail to enter and 13 steps with handrail to access bedroom. Pt has a bathtub with grab bars and a curtain. Pt owns a rolling walker, cane and has a comfort height toilet. Pt drives a car.

## 2017-11-02 NOTE — DISCHARGE NOTE ADULT - PLAN OF CARE
Improve quality of life Your new total knee requires proper care.  Your care provider is your best resource for care information.  Please follow these basic guidelines.  Use pain medication if needed as prescribed.  Ice packs are a helpful addition to your comfort.  Applying a  waterproof ice pack over a cloth or thin towel to the site for 30 minutes per hour may provide benefit by reducing swelling and discomfort.  Your Physical Therapy/Occupational Therapy may include ambulation, transfers, stairs, ADL's (activities of daily living), range of motion exercises, and isometrics.  Your participation is vital to your recovery.    -Your Activity  • Weight Bearing as tolerated with rolling walker.  • Take short, frequent walks increasing the distance that you walk each day as tolerated.  • Change your position every hour to decrease pain and stiffness.  • Continue the exercises taught to you by your physical therapist.  • No driving until cleared by the doctor.  • No tub baths, hot tubs, or swimming pools until instructed by your doctor.  • Do not squat down on the floor.  • Do not kneel or twist your knee.    Keep incision clean and dry. Change the dressing daily if there is drainage noted from surgical wound. When there is no drainage, the wound may be left open to air.  Salves, ointments or other topical treatments are not to be used on the wound unless specifically recommended by your doctor.   If you have sutures (stiches) and no drainage form the incision you may shower allowing water to rinse the incision and pat it  dry afterward with a clean towel.  If you have Prineo (tape/glue) you may shower and pat the wound dry.  Prineo removal is done in the office 2 weeks after surgery.    If you have further questions or problems call your doctors office or go to the emergency room. Call your doctor if you experience:  • An increase in pain not controlled by pain medication or change in activity or  position.  • Temperature greater than 101° F.  • Redness, increased swelling or foul smelling drainage from or around the  incision.  • Numbness, tingling or a change in color or temperature of the operative leg.  • Call your doctor immediately if you experience chest pain, shortness of breath or calf pain.    For Constipation :   • Increase your water intake. Drink at least 8 glasses of water daily.  • Try adding fiber to your diet by eating fruits, vegetables and foods that are rich in grains.  • If you do experience constipation, you may take an over-the-counter stool softener/laxative such as Colace, Senokot or Milk of Magnesia.

## 2017-11-02 NOTE — DISCHARGE NOTE ADULT - CARE PLAN
Principal Discharge DX:	S/P TKR (total knee replacement), left  Goal:	Improve quality of life  Instructions for follow-up, activity and diet:	Your new total knee requires proper care.  Your care provider is your best resource for care information.  Please follow these basic guidelines.  Use pain medication if needed as prescribed.  Ice packs are a helpful addition to your comfort.  Applying a  waterproof ice pack over a cloth or thin towel to the site for 30 minutes per hour may provide benefit by reducing swelling and discomfort.  Your Physical Therapy/Occupational Therapy may include ambulation, transfers, stairs, ADL's (activities of daily living), range of motion exercises, and isometrics.  Your participation is vital to your recovery.    -Your Activity  • Weight Bearing as tolerated with rolling walker.  • Take short, frequent walks increasing the distance that you walk each day as tolerated.  • Change your position every hour to decrease pain and stiffness.  • Continue the exercises taught to you by your physical therapist.  • No driving until cleared by the doctor.  • No tub baths, hot tubs, or swimming pools until instructed by your doctor.  • Do not squat down on the floor.  • Do not kneel or twist your knee.    Keep incision clean and dry. Change the dressing daily if there is drainage noted from surgical wound. When there is no drainage, the wound may be left open to air.  Salves, ointments or other topical treatments are not to be used on the wound unless specifically recommended by your doctor.   If you have sutures (stiches) and no drainage form the incision you may shower allowing water to rinse the incision and pat it  dry afterward with a clean towel.  If you have Prineo (tape/glue) you may shower and pat the wound dry.  Prineo removal is done in the office 2 weeks after surgery.    If you have further questions or problems call your doctors office or go to the emergency room.  Instructions for follow-up, activity and diet:	Call your doctor if you experience:  • An increase in pain not controlled by pain medication or change in activity or  position.  • Temperature greater than 101° F.  • Redness, increased swelling or foul smelling drainage from or around the  incision.  • Numbness, tingling or a change in color or temperature of the operative leg.  • Call your doctor immediately if you experience chest pain, shortness of breath or calf pain.    For Constipation :   • Increase your water intake. Drink at least 8 glasses of water daily.  • Try adding fiber to your diet by eating fruits, vegetables and foods that are rich in grains.  • If you do experience constipation, you may take an over-the-counter stool softener/laxative such as Colace, Senokot or Milk of Magnesia.

## 2017-11-02 NOTE — DISCHARGE NOTE ADULT - PATIENT PORTAL LINK FT
“You can access the FollowHealth Patient Portal, offered by [facility], by registering with the following website: [email or web address]”

## 2017-11-02 NOTE — OCCUPATIONAL THERAPY INITIAL EVALUATION ADULT - NS ASR FOLLOW COMMAND OT EVAL
Patient educated verbally regarding LE weight bearing status & role of OT. Pt. provided with education folder including functional exercises, TKR education & caregiver guide pamphlet. Pt educated regarding fall prevention in hospital and recommendation to use call bell/ask for assistance with all ADL's/transfers etc./able to follow multistep instructions/100% of the time

## 2017-11-02 NOTE — DISCHARGE NOTE ADULT - HOSPITAL COURSE
MIAH FIGUEROA    Admitted on 11-01-17     72y y.o.  Male with history of Primary localized osteoarthritis of left knee    Surgery:   Knee replacement    Orthopedic Surgeon:   Dr. DAVE Carter    Carmel-operative antibiotic:  ceFAZolin   IVPB:,       Consulted Services : Hospitalist, Physical Therapy, Occupational Therapy    Typical Physical & occupational therapy modalities post Knee replacement   were performed including ambulation training, range of motion, ADL's, and transfers.     DVT prophylaxis:  aspirin enteric coated: 162 milliGRAM(s)       The patient had a clean appearing surgical incision with no sign of surgical site infections and had a stable neuro / vascular exam of the operated extremity.  After progression of mobility guided by the PT/ OT staff,  the patient was felt to benefit from further rehabilitative care for restoration to level of function. This was felt to best be accomplished in Rehab/Home.    Discharge and Orthopedic Care instructions were delineated in the Discharge Plan and reviewed with the patient. All medications were delineated in the medication reconciliation tool and key points were reviewed with the patient.     This patient was deemed stable from an Orthopedic & medical standpoint for discharge today.  Upon completion of Home care he will be  following up with Dr. DAVE Carter for office  follow up Orthopedic care.     Patient Discharged with Following prescriptions:    3 in 1 commode: s/p left tkr  acetaminophen 500 mg oral tablet: 2 tab(s) orally every 8 hours  aspirin 81 mg oral delayed release tablet: 2 tab(s) orally every 12 hours  celecoxib 200 mg oral capsule: 1 cap(s) orally 2 times a day (after meals)  oxyCODONE 5 mg oral tablet: 1 tab(s) orally every 4 hours (5 times/day), As Needed -Mild Pain MDD:6     Reference #: [id number]  pantoprazole 40 mg oral delayed release tablet: 1 tab(s) orally once a day

## 2017-11-02 NOTE — DISCHARGE NOTE ADULT - MEDICATION SUMMARY - MEDICATIONS TO CHANGE
I will SWITCH the dose or number of times a day I take the medications listed below when I get home from the hospital:    aspirin 325 mg oral tablet  -- 1 tab(s) by mouth once a day (at bedtime)

## 2017-11-02 NOTE — DISCHARGE NOTE ADULT - MEDICATION SUMMARY - MEDICATIONS TO TAKE
I will START or STAY ON the medications listed below when I get home from the hospital:    3 in 1 commode  -- s/p left tkr  -- Indication: For S/P TKR (total knee replacement), left    acetaminophen 500 mg oral tablet  -- 2 tab(s) by mouth every 8 hours  -- Indication: For Pain    celecoxib 200 mg oral capsule  -- 1 cap(s) by mouth 2 times a day (after meals)  -- Indication: For Pain    aspirin 81 mg oral delayed release tablet  -- 2 tab(s) by mouth every 12 hours  -- Indication: For Clot prevention    oxyCODONE 5 mg oral tablet  -- 1 tab(s) by mouth every 4 hours (5 times/day), As Needed -Mild Pain MDD:6     Reference #: [id number]  -- Indication: For Pain    simvastatin 40 mg oral tablet  -- 1 tab(s) by mouth once a day (at bedtime)  -- Indication: For Hyperlipidemia    olmesartan-hydrochlorothiazide 40 mg-25 mg oral tablet  -- 1 tab(s) by mouth once a day  -- Indication: For HTN (hypertension)    labetalol 200 mg oral tablet  -- 400 milligram(s) by mouth 2 times a day  -- Indication: For HTN (hypertension)    Norvasc 5 mg oral tablet  --  by mouth 2 times a day  -- Indication: For HTN (hypertension)    docusate sodium 100 mg oral capsule  -- 1 cap(s) by mouth 3 times a day as needed  -- Indication: For Constipation    polyethylene glycol 3350 oral powder for reconstitution  -- 17 gram(s) by mouth once a day as needed  -- Indication: For Constipation    pantoprazole 40 mg oral delayed release tablet  -- 1 tab(s) by mouth once a day  -- Indication: For Gastrointestinal protection    Vitamin D3 2000 intl units oral capsule  -- 1 cap(s) by mouth once a day  -- Indication: For Vitamins

## 2017-11-02 NOTE — PROGRESS NOTE ADULT - SUBJECTIVE AND OBJECTIVE BOX
CC.  Left Knee pain  HPI. Patient reports Left knee pain controlled.  Feels better.  Offers no other complaints      Constitutional: No fever, fatigue or weight loss.  Skin: No rash.  Eyes: No recent vision problems or eye pain.  ENT: No congestion, ear pain, or sore throat.  Endocrine: No thyroid problems.  Cardiovascular: No chest pain or palpation.  Respiratory: No cough, shortness of breath, congestion, or wheezing.  Gastrointestinal: No abdominal pain, nausea, vomiting, or diarrhea.  Genitourinary: No dysuria.  Musculoskeletal: No joint swelling.  Neurologic: No headache.      MEDICATIONS  (STANDING):  amLODIPine   Tablet 5 milliGRAM(s) Oral two times a day  cholecalciferol 2000 Unit(s) Oral daily  docusate sodium 100 milliGRAM(s) Oral three times a day  labetalol 200 milliGRAM(s) Oral two times a day  lactated ringers. 1000 milliLiter(s) (75 mL/Hr) IV Continuous <Continuous>  losartan 100 milliGRAM(s) Oral daily  pantoprazole    Tablet 40 milliGRAM(s) Oral daily  polyethylene glycol 3350 17 Gram(s) Oral daily  potassium chloride    Tablet ER 40 milliEquivalent(s) Oral once  simvastatin 40 milliGRAM(s) Oral at bedtime    MEDICATIONS  (PRN):  aluminum hydroxide/magnesium hydroxide/simethicone Suspension 30 milliLiter(s) Oral four times a day PRN Indigestion  HYDROmorphone  Injectable 0.5 milliGRAM(s) IV Push every 3 hours PRN Severe Pain  magnesium hydroxide Suspension 30 milliLiter(s) Oral daily PRN Constipation  ondansetron Injectable 4 milliGRAM(s) IV Push every 6 hours PRN Nausea and/or Vomiting  oxyCODONE    IR 5 milliGRAM(s) Oral every 3 hours PRN Mild Pain  oxyCODONE    IR 10 milliGRAM(s) Oral every 3 hours PRN Moderate Pain          Vital Signs Last 24 Hrs  T(C): 36.6 (02 Nov 2017 07:45), Max: 37.1 (01 Nov 2017 23:30)  T(F): 97.8 (02 Nov 2017 07:45), Max: 98.8 (01 Nov 2017 23:30)  HR: 61 (02 Nov 2017 07:45) (54 - 76)  BP: 149/77 (02 Nov 2017 07:45) (103/72 - 156/89)  BP(mean): --  RR: 16 (02 Nov 2017 07:45) (12 - 22)  SpO2: 97% (02 Nov 2017 07:45) (97% - 100%)      PHYSICAL EXAM-  GENERAL: NAD  HEAD:  Atraumatic, Normocephalic  NECK: Supple, No JVD, Normal thyroid  NERVOUS SYSTEM:  Alert & Oriented X3, Motor Strength 5/5 B/L upper and lower extremities; DTRs 2+ intact and symmetric  CHEST/LUNG: Clear to percussion bilaterally; No rales, rhonchi, wheezing, or rubs  HEART: Regular rate and rhythm; No murmurs, rubs, or gallops  ABDOMEN: Soft, Nontender, Nondistended; Bowel sounds present  EXTREMITIES:  2+ Peripheral Pulses, No clubbing, cyanosis, or edema  SKIN: No rashes or lesions                              14.6   16.7  )-----------( 148      ( 02 Nov 2017 06:52 )             41.2     11-02    139  |  103  |  23  ----------------------------<  134<H>  3.3<L>   |  26  |  1.18    Ca    8.9      02 Nov 2017 06:52

## 2017-11-02 NOTE — DISCHARGE NOTE ADULT - NSFTFSERV1RD_GEN_ALL_CORE
medication teaching and assessment/observation and assessment/teaching and training/rehabilitation services

## 2017-11-02 NOTE — PROGRESS NOTE ADULT - SUBJECTIVE AND OBJECTIVE BOX
Discharge medication calendar:  (ASA 325mg QDay  preop)  ASA EC 162mg q12h x 6 weeks then resume ASA 325mg Qday  APAP 1000mg q8h x 2-3 weeks  Celecoxib 200mg q12h x 2-3 weeks  Pantoprazole 40mg QAM x 6 weeks  Oxycodone PRN  Docusate 100mg TID while taking narcotic  Senna, bisacodyl, or Miralax PRN for treatment of constipation

## 2017-11-03 VITALS
TEMPERATURE: 98 F | RESPIRATION RATE: 16 BRPM | HEART RATE: 63 BPM | SYSTOLIC BLOOD PRESSURE: 133 MMHG | DIASTOLIC BLOOD PRESSURE: 80 MMHG | OXYGEN SATURATION: 97 %

## 2017-11-03 LAB
ANION GAP SERPL CALC-SCNC: 7 MMOL/L — SIGNIFICANT CHANGE UP (ref 5–17)
BUN SERPL-MCNC: 25 MG/DL — HIGH (ref 7–23)
CALCIUM SERPL-MCNC: 8.5 MG/DL — SIGNIFICANT CHANGE UP (ref 8.4–10.5)
CHLORIDE SERPL-SCNC: 106 MMOL/L — SIGNIFICANT CHANGE UP (ref 96–108)
CO2 SERPL-SCNC: 29 MMOL/L — SIGNIFICANT CHANGE UP (ref 22–31)
CREAT SERPL-MCNC: 1.1 MG/DL — SIGNIFICANT CHANGE UP (ref 0.5–1.3)
GLUCOSE SERPL-MCNC: 110 MG/DL — HIGH (ref 70–99)
HCT VFR BLD CALC: 37.9 % — LOW (ref 39–50)
HGB BLD-MCNC: 13 G/DL — SIGNIFICANT CHANGE UP (ref 13–17)
MAGNESIUM SERPL-MCNC: 1.9 MG/DL — SIGNIFICANT CHANGE UP (ref 1.6–2.6)
MCHC RBC-ENTMCNC: 30.5 PG — SIGNIFICANT CHANGE UP (ref 27–34)
MCHC RBC-ENTMCNC: 34.4 GM/DL — SIGNIFICANT CHANGE UP (ref 32–36)
MCV RBC AUTO: 88.6 FL — SIGNIFICANT CHANGE UP (ref 80–100)
PLATELET # BLD AUTO: 117 K/UL — LOW (ref 150–400)
POTASSIUM SERPL-MCNC: 3.6 MMOL/L — SIGNIFICANT CHANGE UP (ref 3.5–5.3)
POTASSIUM SERPL-SCNC: 3.6 MMOL/L — SIGNIFICANT CHANGE UP (ref 3.5–5.3)
RBC # BLD: 4.28 M/UL — SIGNIFICANT CHANGE UP (ref 4.2–5.8)
RBC # FLD: 11.7 % — SIGNIFICANT CHANGE UP (ref 10.3–14.5)
SODIUM SERPL-SCNC: 142 MMOL/L — SIGNIFICANT CHANGE UP (ref 135–145)
WBC # BLD: 12.9 K/UL — HIGH (ref 3.8–10.5)
WBC # FLD AUTO: 12.9 K/UL — HIGH (ref 3.8–10.5)

## 2017-11-03 PROCEDURE — 94664 DEMO&/EVAL PT USE INHALER: CPT

## 2017-11-03 PROCEDURE — 85027 COMPLETE CBC AUTOMATED: CPT

## 2017-11-03 PROCEDURE — 97535 SELF CARE MNGMENT TRAINING: CPT

## 2017-11-03 PROCEDURE — C1713: CPT

## 2017-11-03 PROCEDURE — 88305 TISSUE EXAM BY PATHOLOGIST: CPT

## 2017-11-03 PROCEDURE — 73562 X-RAY EXAM OF KNEE 3: CPT

## 2017-11-03 PROCEDURE — 99238 HOSP IP/OBS DSCHRG MGMT 30/<: CPT

## 2017-11-03 PROCEDURE — 97161 PT EVAL LOW COMPLEX 20 MIN: CPT

## 2017-11-03 PROCEDURE — 97165 OT EVAL LOW COMPLEX 30 MIN: CPT

## 2017-11-03 PROCEDURE — 97116 GAIT TRAINING THERAPY: CPT

## 2017-11-03 PROCEDURE — 97530 THERAPEUTIC ACTIVITIES: CPT

## 2017-11-03 PROCEDURE — C1889: CPT

## 2017-11-03 PROCEDURE — 80048 BASIC METABOLIC PNL TOTAL CA: CPT

## 2017-11-03 PROCEDURE — C1776: CPT

## 2017-11-03 PROCEDURE — 83735 ASSAY OF MAGNESIUM: CPT

## 2017-11-03 PROCEDURE — 88311 DECALCIFY TISSUE: CPT

## 2017-11-03 PROCEDURE — 97110 THERAPEUTIC EXERCISES: CPT

## 2017-11-03 RX ORDER — OLMESARTAN MEDOXOMIL-HYDROCHLOROTHIAZIDE 25; 40 MG/1; MG/1
1 TABLET, FILM COATED ORAL
Qty: 0 | Refills: 0 | COMMUNITY

## 2017-11-03 RX ORDER — OLMESARTAN MEDOXOMIL-HYDROCHLOROTHIAZIDE 25; 40 MG/1; MG/1
1 TABLET, FILM COATED ORAL
Qty: 0 | Refills: 0 | COMMUNITY
Start: 2017-11-03

## 2017-11-03 RX ORDER — POLYETHYLENE GLYCOL 3350 17 G/17G
17 POWDER, FOR SOLUTION ORAL
Qty: 0 | Refills: 0 | COMMUNITY
Start: 2017-11-03

## 2017-11-03 RX ORDER — DOCUSATE SODIUM 100 MG
1 CAPSULE ORAL
Qty: 0 | Refills: 0 | COMMUNITY
Start: 2017-11-03

## 2017-11-03 RX ADMIN — Medication 162 MILLIGRAM(S): at 08:37

## 2017-11-03 RX ADMIN — CELECOXIB 200 MILLIGRAM(S): 200 CAPSULE ORAL at 08:37

## 2017-11-03 RX ADMIN — Medication 1000 MILLIGRAM(S): at 06:07

## 2017-11-03 RX ADMIN — Medication 2000 UNIT(S): at 13:04

## 2017-11-03 RX ADMIN — PANTOPRAZOLE SODIUM 40 MILLIGRAM(S): 20 TABLET, DELAYED RELEASE ORAL at 13:04

## 2017-11-03 RX ADMIN — Medication 1000 MILLIGRAM(S): at 00:00

## 2017-11-03 RX ADMIN — CELECOXIB 200 MILLIGRAM(S): 200 CAPSULE ORAL at 08:39

## 2017-11-03 RX ADMIN — LOSARTAN POTASSIUM 100 MILLIGRAM(S): 100 TABLET, FILM COATED ORAL at 06:09

## 2017-11-03 RX ADMIN — Medication 100 MILLIGRAM(S): at 06:08

## 2017-11-03 RX ADMIN — Medication 1000 MILLIGRAM(S): at 06:32

## 2017-11-03 RX ADMIN — AMLODIPINE BESYLATE 5 MILLIGRAM(S): 2.5 TABLET ORAL at 06:08

## 2017-11-03 RX ADMIN — Medication 200 MILLIGRAM(S): at 06:08

## 2017-11-03 NOTE — PROGRESS NOTE ADULT - SUBJECTIVE AND OBJECTIVE BOX
CC.  left knee pain  HPI.  patient reports knee pain is well controlled.  Offers no other complaints      Constitutional: No fever, fatigue or weight loss.  Skin: No rash.  Eyes: No recent vision problems or eye pain.  ENT: No congestion, ear pain, or sore throat.  Endocrine: No thyroid problems.  Cardiovascular: No chest pain or palpation.  Respiratory: No cough, shortness of breath, congestion, or wheezing.  Gastrointestinal: No abdominal pain, nausea, vomiting, or diarrhea.  Genitourinary: No dysuria.  Musculoskeletal: No joint swelling.  Neurologic: No headache.      MEDICATIONS  (STANDING):  amLODIPine   Tablet 5 milliGRAM(s) Oral two times a day  cholecalciferol 2000 Unit(s) Oral daily  docusate sodium 100 milliGRAM(s) Oral three times a day  labetalol 200 milliGRAM(s) Oral two times a day  losartan 100 milliGRAM(s) Oral daily  pantoprazole    Tablet 40 milliGRAM(s) Oral daily  polyethylene glycol 3350 17 Gram(s) Oral daily  simvastatin 40 milliGRAM(s) Oral at bedtime    MEDICATIONS  (PRN):  aluminum hydroxide/magnesium hydroxide/simethicone Suspension 30 milliLiter(s) Oral four times a day PRN Indigestion  HYDROmorphone  Injectable 0.5 milliGRAM(s) IV Push every 3 hours PRN Severe Pain  magnesium hydroxide Suspension 30 milliLiter(s) Oral daily PRN Constipation  ondansetron Injectable 4 milliGRAM(s) IV Push every 6 hours PRN Nausea and/or Vomiting  oxyCODONE    IR 5 milliGRAM(s) Oral every 3 hours PRN Mild Pain  oxyCODONE    IR 10 milliGRAM(s) Oral every 3 hours PRN Moderate Pain                                13.0   12.9  )-----------( 117      ( 03 Nov 2017 07:08 )             37.9     11-03    142  |  106  |  25<H>  ----------------------------<  110<H>  3.6   |  29  |  1.10    Ca    8.5      03 Nov 2017 07:08  Mg     1.9     11-03

## 2017-11-03 NOTE — PROGRESS NOTE ADULT - SUBJECTIVE AND OBJECTIVE BOX
ORTHOPEDIC PA PROGRESS NOTE  MIAH FIGUEROA      72y Male                                                                                                                               POD # 2       STATUS POST:               Pre-Op Dx: Primary localized osteoarthritis of left knee    Post-Op Dx:  Primary localized osteoarthritis of left knee    Procedure: Knee replacement: left by Dr. Carter 11/1/17                                                Pain (0-10):  Pt reports no pain. Overall doing well. Denies any CP, SOB, fever, chills. Numbness/tingling. Pt is positive void, positive BM.  Current Pain Management:    [X ] Po Analgesics [ X] IM /IV Anagesics     T(F): 98.1  HR: 85  BP: 160/95  RR: 16  SpO2: 96%                         13.0   12.9  )-----------( 117      ( 03 Nov 2017 07:08 )             37.9         11-03    142  |  106  |  25<H>  ----------------------------<  110<H>  3.6   |  29  |  1.10    Ca    8.5      03 Nov 2017 07:08  Mg     1.9     11-03      Physical Exam :    -   Dressing  C/D/I.   -  Removed, no sign of infection, no erythema, no ecchymosis, prineo intact, no dehesience, no dehesience, no drainage  -   Distal Neurvascular status intact grossly.   -   Warm well perfused; capillary refill <3 seconds   -   (+)EHL/FHL   -   (+) Sensation to light touch  -   (-) Calf tenderness Bilaterally    A/P: 72y Male s/p Knee replacement: left     -   Ortho Stable  -   Pain control   -   f/u am labs  -   Continue PT/OT  -   Medicine to follow  -   DVT ppx:     [X ]SCDs     [ X] ASA      -   Weight bearing status:  WBAT [X ]        -  Dispo:   Home when medically and PT cleared

## 2017-11-06 ENCOUNTER — APPOINTMENT (OUTPATIENT)
Dept: ORTHOPEDIC SURGERY | Facility: HOSPITAL | Age: 72
End: 2017-11-06

## 2017-11-07 ENCOUNTER — APPOINTMENT (OUTPATIENT)
Dept: UROLOGY | Facility: CLINIC | Age: 72
End: 2017-11-07
Payer: COMMERCIAL

## 2017-11-07 PROCEDURE — 99212 OFFICE O/P EST SF 10 MIN: CPT

## 2017-11-20 ENCOUNTER — APPOINTMENT (OUTPATIENT)
Dept: ORTHOPEDIC SURGERY | Facility: CLINIC | Age: 72
End: 2017-11-20
Payer: COMMERCIAL

## 2017-11-20 ENCOUNTER — APPOINTMENT (OUTPATIENT)
Dept: UROLOGY | Facility: CLINIC | Age: 72
End: 2017-11-20
Payer: COMMERCIAL

## 2017-11-20 VITALS
HEIGHT: 70 IN | WEIGHT: 186 LBS | SYSTOLIC BLOOD PRESSURE: 125 MMHG | DIASTOLIC BLOOD PRESSURE: 83 MMHG | HEART RATE: 65 BPM | BODY MASS INDEX: 26.63 KG/M2

## 2017-11-20 DIAGNOSIS — N39.3 STRESS INCONTINENCE (FEMALE) (MALE): ICD-10-CM

## 2017-11-20 PROCEDURE — 99213 OFFICE O/P EST LOW 20 MIN: CPT

## 2017-11-20 PROCEDURE — 73562 X-RAY EXAM OF KNEE 3: CPT | Mod: LT

## 2017-11-20 PROCEDURE — 99024 POSTOP FOLLOW-UP VISIT: CPT

## 2017-11-20 RX ORDER — OXYCODONE 5 MG/1
5 TABLET ORAL
Qty: 60 | Refills: 0 | Status: ACTIVE | COMMUNITY
Start: 2017-11-20 | End: 1900-01-01

## 2017-11-20 RX ORDER — AMOXICILLIN 500 MG/1
500 CAPSULE ORAL
Qty: 20 | Refills: 4 | Status: ACTIVE | COMMUNITY
Start: 2017-11-20 | End: 1900-01-01

## 2018-01-02 ENCOUNTER — APPOINTMENT (OUTPATIENT)
Dept: ORTHOPEDIC SURGERY | Facility: CLINIC | Age: 73
End: 2018-01-02
Payer: COMMERCIAL

## 2018-01-02 VITALS
HEIGHT: 70 IN | WEIGHT: 186 LBS | SYSTOLIC BLOOD PRESSURE: 115 MMHG | BODY MASS INDEX: 26.63 KG/M2 | DIASTOLIC BLOOD PRESSURE: 70 MMHG | HEART RATE: 71 BPM

## 2018-01-02 DIAGNOSIS — Z96.652 PRESENCE OF LEFT ARTIFICIAL KNEE JOINT: ICD-10-CM

## 2018-01-02 PROCEDURE — 99024 POSTOP FOLLOW-UP VISIT: CPT

## 2018-01-02 PROCEDURE — 73562 X-RAY EXAM OF KNEE 3: CPT | Mod: LT

## 2018-07-16 PROBLEM — N36.42 INTRINSIC SPHINCTER DEFICIENCY (ISD): Chronic | Status: ACTIVE | Noted: 2017-10-13

## 2022-10-18 ENCOUNTER — EMERGENCY (EMERGENCY)
Facility: HOSPITAL | Age: 77
LOS: 1 days | Discharge: HOME | End: 2022-10-18
Attending: STUDENT IN AN ORGANIZED HEALTH CARE EDUCATION/TRAINING PROGRAM
Payer: COMMERCIAL

## 2022-10-18 VITALS
OXYGEN SATURATION: 97 % | HEIGHT: 71 IN | TEMPERATURE: 98 F | HEART RATE: 87 BPM | RESPIRATION RATE: 18 BRPM | DIASTOLIC BLOOD PRESSURE: 82 MMHG | SYSTOLIC BLOOD PRESSURE: 164 MMHG | WEIGHT: 199.96 LBS

## 2022-10-18 DIAGNOSIS — Z98.89 OTHER SPECIFIED POSTPROCEDURAL STATES: Chronic | ICD-10-CM

## 2022-10-18 DIAGNOSIS — Z98.890 OTHER SPECIFIED POSTPROCEDURAL STATES: Chronic | ICD-10-CM

## 2022-10-18 DIAGNOSIS — Z90.79 ACQUIRED ABSENCE OF OTHER GENITAL ORGAN(S): Chronic | ICD-10-CM

## 2022-10-18 DIAGNOSIS — N36.42 INTRINSIC SPHINCTER DEFICIENCY (ISD): Chronic | ICD-10-CM

## 2022-10-18 PROBLEM — R35.1 NOCTURIA: Chronic | Status: ACTIVE | Noted: 2017-10-13

## 2022-10-18 PROBLEM — M19.90 UNSPECIFIED OSTEOARTHRITIS, UNSPECIFIED SITE: Chronic | Status: ACTIVE | Noted: 2017-10-13

## 2022-10-18 PROBLEM — M25.562 PAIN IN LEFT KNEE: Chronic | Status: ACTIVE | Noted: 2017-10-13

## 2022-10-18 PROBLEM — I11.9 HYPERTENSIVE HEART DISEASE WITHOUT HEART FAILURE: Chronic | Status: ACTIVE | Noted: 2017-10-13

## 2022-10-18 PROCEDURE — 73110 X-RAY EXAM OF WRIST: CPT

## 2022-10-18 PROCEDURE — 99283 EMERGENCY DEPT VISIT LOW MDM: CPT

## 2022-10-18 PROCEDURE — 73110 X-RAY EXAM OF WRIST: CPT | Mod: 26,RT

## 2022-10-18 RX ORDER — IBUPROFEN 200 MG
600 TABLET ORAL ONCE
Refills: 0 | Status: COMPLETED | OUTPATIENT
Start: 2022-10-18 | End: 2022-10-18

## 2022-10-18 RX ORDER — CEPHALEXIN 500 MG
1 CAPSULE ORAL
Qty: 28 | Refills: 0
Start: 2022-10-18 | End: 2022-10-24

## 2022-10-18 RX ORDER — CEPHALEXIN 500 MG
500 CAPSULE ORAL ONCE
Refills: 0 | Status: COMPLETED | OUTPATIENT
Start: 2022-10-18 | End: 2022-10-18

## 2022-10-18 RX ADMIN — Medication 60 MILLIGRAM(S): at 02:56

## 2022-10-18 RX ADMIN — Medication 600 MILLIGRAM(S): at 02:56

## 2022-10-18 RX ADMIN — Medication 500 MILLIGRAM(S): at 04:09

## 2022-10-18 NOTE — ED ADULT TRIAGE NOTE - CHIEF COMPLAINT QUOTE
patient c/o right wrist pain worsening over the last week. states he might have hurt it at the gym. notable swelling to right wrist, hand and fingers. hx of tendonitis in right hand in may, feels similar but in different spot.

## 2022-10-18 NOTE — ED PROVIDER NOTE - CARE PROVIDER_API CALL
Corie Whitfield)  Plastic Surgery; Surgery of the Hand  [address], [facility] [address]  [place] [address]  Phone: [phone]  Fax: [phone]  Follow Up Time:     Janina Mcclellan)  Orthopaedic Surgery; Surgery of the Hand  [address] [place] [address]  Phone: [phone]  Fax: [phone]  Follow Up Time:    Corie Whitfield)  Plastic Surgery; Surgery of the Hand  [address] [facility], [address]  [place] [address]  Phone: [phone]  Fax: [phone]  Follow Up Time:     Janina Mcclellan)  Orthopaedic Surgery; Surgery of the Hand  [address] [place] [address]  Phone: [phone]  Fax: [phone]  Follow Up Time:     Foster Blank)  [facility] [place] [address]  Phone: [phone]  Fax: [phone]  Follow Up Time:

## 2022-10-18 NOTE — ED PROVIDER NOTE - PROVIDER TOKENS
PROVIDER:[TOKEN:[[id number]]],PROVIDER:[TOKEN:[[id number]]] PROVIDER:[TOKEN:[[id number]]],PROVIDER:[TOKEN:[[id number]]],PROVIDER:[TOKEN:[[id number]]]

## 2022-10-18 NOTE — ED PROVIDER NOTE - ATTENDING APP SHARED VISIT CONTRIBUTION OF CARE
76 yo healthy well appearing male presents for evaluation of acute right hand/distal forearm mild erythema and swelling. I personally saw the patient with the PA, and completed the key components of the history and physical exam. I then discussed the management plan with the PA.

## 2022-10-18 NOTE — ED PROVIDER NOTE - CLINICAL SUMMARY MEDICAL DECISION MAKING FREE TEXT BOX
78 yo male PMHx HTN presents to ED c/o right wrist pain x1 week. XR only with degenerative changes. Antiinflammatories, abx, splint, hand follow up. Patient to be discharged. Patient and/or guardian provided verbal/written discharge instructions and return precautions. Speciality follow up care initiated. Patient and/or guardian expresses understanding and agreement. 76 yo male PMHx HTN presents to ED c/o right wrist pain x1 week. XR only with degenerative changes. Antiinflammatories, abx, splint (patient presents with), hand follow up. Patient to be discharged. Patient and/or guardian provided verbal/written discharge instructions and return precautions. Speciality follow up care initiated. Patient and/or guardian expresses understanding and agreement.

## 2022-10-18 NOTE — ED PROVIDER NOTE - PATIENT PORTAL LINK FT
You can access the FollowMyHealth Patient Portal offered by [facility] by registering at the following website: [email or web address]. By joining [email or web address]’s FollowMyHealth portal, you will also be able to view your health information using other applications (apps) compatible with our system.

## 2022-10-18 NOTE — ED ADULT NURSE NOTE - OBJECTIVE STATEMENT
pt is a 77y male, aox4, ambulatory.  right wrist pain x1 week after lifting at the gym.  denies swelling, numbness, weakness, tingling.  states he has had this issue before and saw ortho, was dx with tendonitis and given steroid injection for relief.

## 2022-10-18 NOTE — ED PROVIDER NOTE - OBJECTIVE STATEMENT
76 yo male PMHx HTN presents to ED c/o right wrist pain x1 week. Reports one day after lifting at gym one week ago, sxms developed. Exact same sxms occurred in May. At time followed up with orthopedist, told tendonitis and given steroid injection with improvement. No further complaints at this time.

## 2022-10-18 NOTE — ED PROVIDER NOTE - NSFOLLOWUPINSTRUCTIONS_ED_ALL_ED_FT
- Prescription sent to pharmacy.  - Ibuprofen 600mg every 6 hours as needed for pain.  - Acetaminophen 650mg every 6 hours as needed for pain.   - Please bring all documentation from your ED visit to any related future follow up appointment.  - Please call to schedule follow up appointment with your primary care physician within 24-48 hours.  - Please seek immediate medical attention or return to the ED for any new/worsening, signs/symptoms, or concerns.    Feel better! - Prescription sent to pharmacy.  - Ibuprofen 600mg every 6 hours as needed for pain.  - Acetaminophen 650mg every 6 hours as needed for pain.   - Rest.  - Splint.  - Please call today to schedule follow up appointment with hand specialist.   - Please bring all documentation from your ED visit to any related future follow up appointment.  - Please call to schedule follow up appointment with your primary care physician within 24-48 hours.  - Please seek immediate medical attention or return to the ED for any new/worsening, signs/symptoms, or concerns.    Feel better! - Prescription sent to pharmacy.  - Ibuprofen 600mg every 6 hours as needed for pain.  - Acetaminophen 650mg every 6 hours as needed for pain.   - Rest.  - Splint.  - Please call today to schedule follow up appointment with hand specialist.   - Please bring all documentation from your ED visit to any related future follow up appointment.  - Please call to schedule follow up appointment with your primary care physician within 24-48 hours.  - Please seek immediate medical attention or return to the ED for any new/worsening, signs/symptoms, or concerns.    Feel better!      Wrist Pain, Adult      There are many things that can cause wrist pain. Some common causes include:  •An injury to the wrist area, such as a sprain, strain, or fracture.      •Overuse of the joint.      •A condition that causes increased pressure on a nerve in the wrist (carpal tunnel syndrome).      •Wear and tear of the joints that occurs with aging (osteoarthritis).      •Other types of joint inflammation and stiffness (arthritis).      Sometimes, the cause of wrist pain is not known. Often, the pain goes away when you follow instructions from your health care provider for relieving pain at home, such as resting the wrist, icing the wrist, or using a splint or an elastic wrap for a short time. If your wrist pain continues, it is important to tell your health care provider.      Follow these instructions at home:    If you have a splint or elastic wrap:     •Wear the splint or wrap as told by your health care provider. Remove it only as told by your health care provider. Ask your health care provider if you may remove it for bathing.      •Loosen the splint or wrap if your fingers tingle, become numb, or turn cold and blue.      •Check the skin around the splint or wrap every day. Tell your health care provider about any concerns.      •Keep the splint or wrap clean.    •If the splint or wrap is not waterproof:  •Do not let it get wet.      •Cover it with a watertight covering when you take a bath or shower.          Managing pain, stiffness, and swelling    •If directed, put ice on the painful area. To do this:  •If you have a removable splint or wrap, remove it as told by your health care provider.      •Put ice in a plastic bag.      •Place a towel between your skin and the bag or between your splint or wrap and the bag.      •Leave the ice on for 20 minutes, 2–3 times a day.        •Move your fingers often to reduce stiffness and swelling.      •Raise (elevate) the injured area above the level of your heart while you are sitting or lying down.      Activity     •Rest your affected wrist as told by your health care provider.      •Return to your normal activities as told by your health care provider. Ask your health care provider what activities are safe for you.      •Ask your health care provider when it is safe to drive if you have a splint or wrap on your wrist.      •Do exercises as told by your health care provider.      General instructions     •Pay attention to any changes in your symptoms.      •Take over-the-counter and prescription medicines only as told by your health care provider.      •Keep all follow-up visits as told by your health care provider. This is important.        Contact a health care provider if:    •You have a sudden, sharp pain in the wrist, hand, or arm that is different or new.      •The swelling or bruising on your wrist or hand gets worse.      •Your skin becomes red, gets a rash, or has open sores.      •Your pain does not get better or it gets worse.      •You have a fever or chills.        Get help right away if:    •You lose feeling in your fingers or hand.      •Your fingers turn white, very red, or cold and blue.      •You cannot move your fingers.        Summary    •Wrist pain in an adult has many different causes.      •If your wrist pain continues, it is important to tell your health care provider.      •You may need to wear a splint or an elastic wrap for a short period of time.      •Return to your normal activities as told by your health care provider. Ask your health care provider what activities are safe for you.      This information is not intended to replace advice given to you by your health care provider. Make sure you discuss any questions you have with your health care provider.

## 2022-10-18 NOTE — ED PROVIDER NOTE - PHYSICAL EXAMINATION
Gen: Nontoxic, well appearing, in NAD.  Skin: Warm and dry as visualized.  Head: NC/AT.  Eyes: PERRLA. EOMI.  Neck: Supple, FROM. Trachea midline.   Resp: No distress.  Cardio: Well perfused.  PV: 2+ radial pulses.   Ext: No deformities. +Swelling of right wrist extending into hands and fingers. No erythema, fluctuance, increased warmth. MAEx4. Limited ROM at wrist.   Neuro: A&Ox3. Sensation intact.   Psych: Normal affect and mood.

## 2022-10-18 NOTE — ED PROVIDER NOTE - NS ED ATTENDING STATEMENT MOD
This was a shared visit with the RHEA. I reviewed and verified the documentation and independently performed the documented:

## 2022-10-18 NOTE — ED PROVIDER NOTE - CARE PROVIDERS DIRECT ADDRESSES
,DirectAddress_Unknown,DirectAddress_Unknown ,DirectAddress_Unknown,DirectAddress_Unknown,[email or web address]

## 2022-12-15 NOTE — CONSULT NOTE ADULT - MINUTES
Patient is in the lateral left side position.   The body was positioned using the following devices: blanket, wedge and draw sheet.  The head was positioned using the following devices: regular pillow.  Pt positioned self
Specimens verified per policy.
70

## 2023-03-06 NOTE — OCCUPATIONAL THERAPY INITIAL EVALUATION ADULT - IADL RETRAINING, OT EVAL
Headaches, intrancranial mass Patient will increase standing tolerance to 5-7 minutes for grooming at the sink in 1-3 sessions.

## 2023-10-28 ENCOUNTER — NON-APPOINTMENT (OUTPATIENT)
Age: 78
End: 2023-10-28

## 2024-10-03 ENCOUNTER — NON-APPOINTMENT (OUTPATIENT)
Age: 79
End: 2024-10-03

## 2024-12-28 NOTE — PATIENT PROFILE ADULT. - PSH
[Never] : never [TextBox_4] : Mr. SANTIAGO STORY is a 22-year-old male who presents here today as an acute visit. S/S with 101.8 fever started [place] chantel. [facility] and albuterol which he has used bid. He denies any wheezing, no further fever.  Biggest c/o pains in chest with cough sometimes sharp, some tightness.  Some cough with colored mucus, not a lot of mucus. Some cough at night with sleep. H/O colonoscopy with polypectomy  2017- benign  H/O radical prostatectomy  2000  S/P arthroscopy of left knee  2005- meniscus repair  S/P tonsillectomy  1954  Urethral sphincter deficiency  s/p urethral sphincter implant 2009; replacement 2014

## 2025-01-26 ENCOUNTER — NON-APPOINTMENT (OUTPATIENT)
Age: 80
End: 2025-01-26

## 2025-02-03 ENCOUNTER — NON-APPOINTMENT (OUTPATIENT)
Age: 80
End: 2025-02-03

## 2025-05-10 ENCOUNTER — NON-APPOINTMENT (OUTPATIENT)
Age: 80
End: 2025-05-10